# Patient Record
Sex: FEMALE | Race: WHITE | Employment: UNEMPLOYED | URBAN - METROPOLITAN AREA
[De-identification: names, ages, dates, MRNs, and addresses within clinical notes are randomized per-mention and may not be internally consistent; named-entity substitution may affect disease eponyms.]

---

## 2017-01-06 DIAGNOSIS — I10 ESSENTIAL HYPERTENSION: ICD-10-CM

## 2017-01-08 RX ORDER — TRIAMTERENE AND HYDROCHLOROTHIAZIDE 37.5; 25 MG/1; MG/1
CAPSULE ORAL
Qty: 30 CAP | Refills: 5 | Status: SHIPPED | OUTPATIENT
Start: 2017-01-08 | End: 2017-04-18 | Stop reason: SDUPTHER

## 2017-02-01 DIAGNOSIS — E78.00 PURE HYPERCHOLESTEROLEMIA: ICD-10-CM

## 2017-02-03 RX ORDER — LOVASTATIN 40 MG/1
TABLET ORAL
Qty: 90 TAB | Refills: 0 | Status: SHIPPED | COMMUNITY
Start: 2017-02-03 | End: 2017-04-18 | Stop reason: SDUPTHER

## 2017-04-18 ENCOUNTER — OFFICE VISIT (OUTPATIENT)
Dept: FAMILY MEDICINE CLINIC | Age: 62
End: 2017-04-18

## 2017-04-18 VITALS
WEIGHT: 223 LBS | TEMPERATURE: 97.2 F | HEART RATE: 68 BPM | BODY MASS INDEX: 41.04 KG/M2 | DIASTOLIC BLOOD PRESSURE: 55 MMHG | HEIGHT: 62 IN | OXYGEN SATURATION: 95 % | SYSTOLIC BLOOD PRESSURE: 104 MMHG

## 2017-04-18 DIAGNOSIS — E66.01 MORBID OBESITY DUE TO EXCESS CALORIES (HCC): ICD-10-CM

## 2017-04-18 DIAGNOSIS — G89.29 CHRONIC PAIN OF LEFT KNEE: ICD-10-CM

## 2017-04-18 DIAGNOSIS — E78.00 PURE HYPERCHOLESTEROLEMIA: ICD-10-CM

## 2017-04-18 DIAGNOSIS — I10 ESSENTIAL HYPERTENSION: Primary | ICD-10-CM

## 2017-04-18 DIAGNOSIS — K21.00 GASTROESOPHAGEAL REFLUX DISEASE WITH ESOPHAGITIS: ICD-10-CM

## 2017-04-18 DIAGNOSIS — M25.562 CHRONIC PAIN OF LEFT KNEE: ICD-10-CM

## 2017-04-18 RX ORDER — LOVASTATIN 40 MG/1
TABLET ORAL
Qty: 30 TAB | Refills: 5 | Status: SHIPPED | OUTPATIENT
Start: 2017-04-18 | End: 2017-04-18 | Stop reason: SDUPTHER

## 2017-04-18 RX ORDER — LOVASTATIN 40 MG/1
40 TABLET ORAL
Qty: 90 TAB | Refills: 1 | Status: SHIPPED | OUTPATIENT
Start: 2017-04-18

## 2017-04-18 RX ORDER — TRIAMTERENE AND HYDROCHLOROTHIAZIDE 37.5; 25 MG/1; MG/1
1 CAPSULE ORAL DAILY
Qty: 90 CAP | Refills: 1 | Status: SHIPPED | OUTPATIENT
Start: 2017-04-18 | End: 2017-04-18 | Stop reason: SDUPTHER

## 2017-04-18 RX ORDER — TRIAMTERENE AND HYDROCHLOROTHIAZIDE 37.5; 25 MG/1; MG/1
1 CAPSULE ORAL DAILY
Qty: 30 CAP | Refills: 0 | Status: SHIPPED | OUTPATIENT
Start: 2017-04-18 | End: 2017-04-18 | Stop reason: SDUPTHER

## 2017-04-18 RX ORDER — PANTOPRAZOLE SODIUM 20 MG/1
20 TABLET, DELAYED RELEASE ORAL DAILY
Qty: 90 TAB | Refills: 1 | Status: SHIPPED | OUTPATIENT
Start: 2017-04-18 | End: 2018-12-18

## 2017-04-18 RX ORDER — TRIAMTERENE AND HYDROCHLOROTHIAZIDE 37.5; 25 MG/1; MG/1
1 CAPSULE ORAL DAILY
Qty: 30 CAP | Refills: 0 | Status: SHIPPED | OUTPATIENT
Start: 2017-04-18 | End: 2017-05-15 | Stop reason: SDUPTHER

## 2017-04-18 RX ORDER — TRIAMTERENE AND HYDROCHLOROTHIAZIDE 37.5; 25 MG/1; MG/1
1 CAPSULE ORAL DAILY
Qty: 30 CAP | Refills: 5 | Status: SHIPPED | OUTPATIENT
Start: 2017-04-18 | End: 2017-04-18 | Stop reason: SDUPTHER

## 2017-04-18 NOTE — PATIENT INSTRUCTIONS
Learning About Dietary Guidelines  What are the Dietary Guidelines for Americans? Dietary Guidelines for Americans provide tips for eating well and staying healthy. This helps reduce the risk for long-term (chronic) diseases. These adult guidelines from the [place] recommend that you:  · Eat lots of fruits, vegetables, whole grains, and low-fat or nonfat dairy products. · Try to balance your eating with your activity. This helps you stay at a healthy weight. · Drink alcohol in moderation, if at all. · Limit foods high in salt, saturated fat, trans fat, and added sugar. What is MyPlate? MyPlate is the U.S. government's food guide. It can help you make your own well-balanced eating plan. A balanced eating plan means that you eat enough, but not too much, and that your food gives you the nutrients you need to stay healthy. MyPlate focuses on eating plenty of whole grains, fruits, and vegetables, and on limiting fat and sugar. It is available online at www. ChooseMyPlate.gov. How can you get started? MyPlate suggests that most adults eat certain amounts from the different food groups:  Grains  Eat 5 to 8 ounces of grains each day. Half of those should be whole grains. Choose whole-grain breads, cold and cooked cereals and grains, pasta (without creamy sauces), hard rolls, or low-fat or fat-free crackers. Vegetables  Eat 2 to 3 cups of vegetables every day. They contain little if any fat. And they have lots of nutrients that help protect against heart disease. Fruits  Eat 1½ to 2 cups of fruits every day. Fruits contain very little fat but lots of nutrients. Protein foods  Most adults need 5 to 6½ ounces each day. Choose fish and lean poultry more often. Eat red meat and fried meats less often. Dried beans, tofu, and nuts are also good sources of protein. Dairy  Most adults need 3 cups of milk and milk products a day. Choose low-fat or fat-free products from this food group.  If you have problems digesting milk, try eating cheese or yogurt instead. Limit fats and oils, including those used in cooking. When you do use fats, choose oils that are liquid at room temperature (unsaturated fats). These include canola oil and olive oil. Avoid foods with trans fats, such as many fried foods, cookies, and snack foods. Where can you learn more? Go to http://gabriel-jay.info/. Enter U141 in the search box to learn more about \"Learning About Dietary Guidelines. \"  Current as of: July 26, 2016  Content Version: 11.2  © 5256-4454 [employer]. Care instructions adapted under license by [employer] (which disclaims liability or warranty for this information). If you have questions about a medical condition or this instruction, always ask your healthcare professional. [address] any warranty or liability for your use of this information.

## 2017-04-18 NOTE — MR AVS SNAPSHOT
Visit Information Date & Time Provider Department Dept. Phone Encounter #  
 4/18/2017  8:00 AM Tika Diana, Braeden Christy [phone] [id number] Follow-up Instructions Return in about 6 months (around 10/18/2017). Upcoming Health Maintenance Date Due Hepatitis C Screening 1955 FOOT EXAM Q1 7/6/1967 MICROALBUMIN Q1 7/6/1967 EYE EXAM RETINAL OR DILATED Q1 7/6/1967 Pneumococcal 19-64 Medium Risk (1 of 1 - PPSV23) 7/6/1976 DTaP/Tdap/Td series (1 - Tdap) 7/6/1978 FOBT Q 1 YEAR AGE 50-75 7/6/2007 BREAST CANCER SCRN MAMMOGRAM 10/3/2014 PAP AKA CERVICAL CYTOLOGY 10/3/2015 INFLUENZA AGE 9 TO ADULT 8/1/2016 HEMOGLOBIN A1C Q6M 10/14/2016 LIPID PANEL Q1 4/14/2017 Allergies as of 4/18/2017  Review Complete On: 4/18/2017 By: Candy Pedersen LPN Severity Noted Reaction Type Reactions Pcn [Penicillins] High 04/03/2013    Anaphylaxis Azithromycin  11/30/2013   Side Effect Other (comments) Tongue gotten black nad had bumps on the back of the tongue. She tolerates Biaxin per patient Current Immunizations  Never Reviewed Name Date Influenza Vaccine 10/30/2015 Influenza Vaccine PF 11/10/2014 Not reviewed this visit You Were Diagnosed With   
  
 Codes Comments Morbid obesity due to excess calories ([facility] [address]    -  Primary ICD-10-CM: E66.01 
ICD-9-CM: 278.01 Essential hypertension     ICD-10-CM: I10 
ICD-9-CM: 401.9 Pure hypercholesterolemia     ICD-10-CM: E78.00 ICD-9-CM: 272.0 Vitals BP Pulse Temp Height(growth percentile) Weight(growth percentile) SpO2  
 104/55 (BP 1 Location: Left arm, BP Patient Position: Sitting) 68 97.2 °F (36.2 °C) (Oral) 5' 2\" (1.575 m) 223 lb (101.2 kg) 95% BMI OB Status Smoking Status 40.79 kg/m2 Postmenopausal Never Smoker Vitals History BMI and BSA Data Body Mass Index Body Surface Area  40.79 kg/m 2 2.1 m 2  
  
  
 Preferred Pharmacy Pharmacy Name Phone [facility] [address]  [facility] [phone] Your Updated Medication List  
  
   
This list is accurate as of: 4/18/17  8:53 AM.  Always use your most recent med list.  
  
  
  
  
 ascorbic acid (vitamin C) 500 mg tablet Commonly known as:  VITAMIN C Take 1,000 mg by mouth. Cholecalciferol (Vitamin D3) 50,000 unit Cap Take  by mouth. Over the counter, dose unknown FISH OIL 1,000 mg Cap Generic drug:  omega-3 fatty acids-vitamin e Take 1 Cap by mouth.  
  
 levothyroxine 75 mcg tablet Commonly known as:  SYNTHROID Take 1 Tab by mouth Daily (before breakfast). lovastatin 40 mg tablet Commonly known as:  MEVACOR Take 1 Tab by mouth nightly. meloxicam 7.5 mg tablet Commonly known as:  MOBIC Take 1 Tab by mouth daily. multivitamin tablet Commonly known as:  ONE A DAY Take 1 Tab by mouth daily. triamterene-hydroCHLOROthiazide 37.5-25 mg per capsule Commonly known as:  Jenny Eagle Take 1 Cap by mouth daily. VITAMIN B-12 1,000 mcg tablet Generic drug:  cyanocobalamin Take 1,000 mcg by mouth daily. Prescriptions Sent to Pharmacy Refills  
 lovastatin (MEVACOR) 40 mg tablet 1 Sig: Take 1 Tab by mouth nightly. Class: Normal  
 Pharmacy: [address], [address] Ph #: [phone] Route: Oral  
 triamterene-hydroCHLOROthiazide (DYAZIDE) 37.5-25 mg per capsule 0 Sig: Take 1 Cap by mouth daily. Class: Normal  
 [facility] [address] AT [facility] [address] [facility] Ph #: [phone] Route: Oral  
  
We Performed the Following CBC W/O DIFF [64374 CPT(R)] HEMOGLOBIN A1C W/O EAG [48896 CPT(R)] LIPID PANEL [30449 CPT(R)] METABOLIC PANEL, COMPREHENSIVE [05216 CPT(R)] Follow-up Instructions Return in about 6 months (around 10/18/2017). Patient Instructions Learning About Dietary Guidelines What are the Dietary Guidelines for Americans? Dietary Guidelines for Americans provide tips for eating well and staying healthy. This helps reduce the risk for long-term (chronic) diseases. These adult guidelines from the [place] recommend that you: 
· Eat lots of fruits, vegetables, whole grains, and low-fat or nonfat dairy products. · Try to balance your eating with your activity. This helps you stay at a healthy weight. · Drink alcohol in moderation, if at all. · Limit foods high in salt, saturated fat, trans fat, and added sugar. What is MyPlate? MyPlate is the U.S. government's food guide. It can help you make your own well-balanced eating plan. A balanced eating plan means that you eat enough, but not too much, and that your food gives you the nutrients you need to stay healthy. MyPlate focuses on eating plenty of whole grains, fruits, and vegetables, and on limiting fat and sugar. It is available online at www. ChooseMyPlate.gov. How can you get started? MyPlate suggests that most adults eat certain amounts from the different food groups: 
Grains Eat 5 to 8 ounces of grains each day. Half of those should be whole grains. Choose whole-grain breads, cold and cooked cereals and grains, pasta (without creamy sauces), hard rolls, or low-fat or fat-free crackers. Vegetables Eat 2 to 3 cups of vegetables every day. They contain little if any fat. And they have lots of nutrients that help protect against heart disease. Fruits Eat 1½ to 2 cups of fruits every day. Fruits contain very little fat but lots of nutrients. Protein foods Most adults need 5 to 6½ ounces each day. Choose fish and lean poultry more often. Eat red meat and fried meats less often. Dried beans, tofu, and nuts are also good sources of protein. Dairy Most adults need 3 cups of milk and milk products a day. Choose low-fat or fat-free products from this food group. If you have problems digesting milk, try eating cheese or yogurt instead. Limit fats and oils, including those used in cooking. When you do use fats, choose oils that are liquid at room temperature (unsaturated fats). These include canola oil and olive oil. Avoid foods with trans fats, such as many fried foods, cookies, and snack foods. Where can you learn more? Go to http://gabriel-jay.info/. Enter F060 in the search box to learn more about \"Learning About Dietary Guidelines. \" Current as of: July 26, 2016 Content Version: 11.2 © 3856-6819 [employer]. Care instructions adapted under license by [employer] (which disclaims liability or warranty for this information). If you have questions about a medical condition or this instruction, always ask your healthcare professional. [place] [address] any warranty or liability for your use of this information. Introducing [facility] & HEALTH SERVICES! Dear Keren Montemayor: Thank you for requesting a [facility] account. Our records indicate that you already have an active [employer] account. You can access your account anytime at [email or web address] Did you know that you can access your hospital and ER discharge instructions at any time in [facility] [facility]? You can also review all of your test results from your hospital stay or ER visit. Additional Information If you have questions, please visit the Frequently Asked Questions section of the [employer] website at [email or web address] Remember, [facility] [facility] is NOT to be used for urgent needs. For medical emergencies, dial 911. Now available from your iPhone and Android! Please provide this summary of care documentation to your next provider. Your primary care clinician is listed as Iesha Dunn.  If you have any questions after today's visit, please call [phone].

## 2017-04-18 NOTE — PROGRESS NOTES
Sarah Quiles is an 63 y.o. female who presents for medication refills. HTN: on dyazide daily. Tries to have low sodium diet. HLD: on lovastatin daily. Hypothyroidism: managed by Dr. Kathrine Goodman, Endocrine. Chronic knee pain: would like referral to Ortho. GERD: hx of hiatal hernia. Complains of difficulty eating sometimes because of acid reflux. Takes tums and maalox. Allergies - reviewed: Allergies   Allergen Reactions    Pcn [Penicillins] Anaphylaxis    Azithromycin Other (comments)     Tongue gotten black nad had bumps on the back of the tongue. She tolerates Biaxin per patient         Medications - reviewed:   Current Outpatient Prescriptions   Medication Sig    lovastatin (MEVACOR) 40 mg tablet Take 1 Tab by mouth nightly.  triamterene-hydroCHLOROthiazide (DYAZIDE) 37.5-25 mg per capsule Take 1 Cap by mouth daily.  pantoprazole (PROTONIX) 20 mg tablet Take 1 Tab by mouth daily.  ascorbic acid (VITAMIN C) 500 mg tablet Take 1,000 mg by mouth.  levothyroxine (SYNTHROID) 75 mcg tablet Take 1 Tab by mouth Daily (before breakfast). (Patient taking differently: Take 88 mcg by mouth Daily (before breakfast). )    omega-3 fatty acids-vitamin e (FISH OIL) 1,000 mg cap Take 1 Cap by mouth.  Cholecalciferol, Vitamin D3, 50,000 unit cap Take  by mouth. Over the counter, dose unknown    cyanocobalamin (VITAMIN B-12) 1,000 mcg tablet Take 1,000 mcg by mouth daily.  multivitamin (ONE A DAY) tablet Take 1 Tab by mouth daily.  meloxicam (MOBIC) 7.5 mg tablet Take 1 Tab by mouth daily. No current facility-administered medications for this visit.           Past Medical History - reviewed:  Past Medical History:   Diagnosis Date    Diabetes ([address]     Hypercholesterolemia     Hypertension     Multiple sclerosis ([facility] [address]     Thyroid disease          Past Surgical History - reviewed:   Past Surgical History:   Procedure Laterality Date    HX ADENOIDECTOMY      HX HERNIA REPAIR  12/17/2007    hiatal hernia    HX TONSILLECTOMY      SHOULDER SURG PROC UNLISTED  7/18/2008    massive shoulder repair; has titanium screws         Social History - reviewed:  Social History     Social History    Marital status:      Spouse name: N/A    Number of children: N/A    Years of education: N/A     Occupational History    Not on file. Social History Main Topics    Smoking status: Never Smoker    Smokeless tobacco: Never Used    Alcohol use No    Drug use: No    Sexual activity: Not on file     Other Topics Concern    Not on file     Social History Narrative         Family History - reviewed:  Family History   Problem Relation Age of Onset    Heart Surgery Father     Heart Attack Father     Heart Disease Father     Cancer Paternal Grandfather      \"all over the body\"         Immunizations - reviewed:   Immunization History   Administered Date(s) Administered    Influenza Vaccine 10/30/2015    Influenza Vaccine PF 11/10/2014           ROS  CONSTITUTIONAL: weight gain  CARDIOVASCULAR: Denies: chest pain  RESPIRATORY: Denies: shortness of breath  GI: abdominal pain        Physical Exam  Visit Vitals    /55 (BP 1 Location: Left arm, BP Patient Position: Sitting)    Pulse 68    Temp 97.2 °F (36.2 °C) (Oral)    Ht 5' 2\" (1.575 m)    Wt 223 lb (101.2 kg)    SpO2 95%    BMI 40.79 kg/m2       General appearance - alert, well appearing, and in no distress  Chest - clear to auscultation, no wheezes, rales or rhonchi, symmetric air entry  Heart - normal rate, regular rhythm, normal S1, S2, no murmurs  Abdomen - soft, nontender, nondistended  Extremities -no pedal edema  Skin - normal coloration and turgor      Assessment/Plan    ICD-10-CM ICD-9-CM    1.  Essential hypertension I10 401.9 CBC W/O DIFF      METABOLIC PANEL, COMPREHENSIVE      LIPID PANEL      HEMOGLOBIN A1C W/O EAG      triamterene-hydroCHLOROthiazide (DYAZIDE) 37.5-25 mg per capsule DISCONTINUED: triamterene-hydroCHLOROthiazide (DYAZIDE) 37.5-25 mg per capsule      DISCONTINUED: triamterene-hydroCHLOROthiazide (DYAZIDE) 37.5-25 mg per capsule      DISCONTINUED: triamterene-hydroCHLOROthiazide (DYAZIDE) 37.5-25 mg per capsule   2. Pure hypercholesterolemia E78.00 272.0 CBC W/O DIFF      METABOLIC PANEL, COMPREHENSIVE      LIPID PANEL      HEMOGLOBIN A1C W/O EAG      lovastatin (MEVACOR) 40 mg tablet      DISCONTINUED: lovastatin (MEVACOR) 40 mg tablet   3. Morbid obesity due to excess calories (HCC) E66.01 278.01 CBC W/O DIFF      METABOLIC PANEL, COMPREHENSIVE      LIPID PANEL      HEMOGLOBIN A1C W/O EAG   4. Gastroesophageal reflux disease with esophagitis K21.0 530.11    5. Chronic pain of left knee M25.562 719.46 REFERRAL TO ORTHOPEDIC SURGERY    G89.29 338.29        HTN: triamterene-HCTZ 37.5-25mg daily. BP controlled. Refilled this. Tries to have a low sodium diet. Check CMP today. HLD: on lovastatin 40mg. Refilled this. Check lipid panel today. Morbid obesity: counseled on weight loss. Check HbA1c. GERD with hx of hiatal hernia: prescribed protonix 20mg daiy. Chronic arthritis: requesting Ortho referral. Given referral to Dr. Steve Santamaria. Preventive screening: check CBC      Follow-up Disposition:  Return in about 6 months (around 10/18/2017). Encouraged to make appointment for CPE. I have discussed the diagnosis with the patient and the intended plan as seen in the above orders. The patient has received an after-visit summary and questions were answered concerning future plans. I have discussed medication side effects and warnings with the patient as well.       Nikita Antunez MD  Family Medicine Resident

## 2017-04-18 NOTE — PROGRESS NOTES
Chief Complaint   Patient presents with    Medication Evaluation     cholesterol    Labs     1. Have you been to the ER, urgent care clinic since your last visit? Hospitalized since your last visit? No    2. Have you seen or consulted any other health care providers outside of the [address] [place] [address] since your last visit? Include any pap smears or colon screening.  No

## 2017-04-19 LAB
ALBUMIN SERPL-MCNC: 4.2 G/DL (ref 3.5–5.5)
ALBUMIN/GLOB SERPL: 1.6 {RATIO} (ref 1.2–2.2)
ALP SERPL-CCNC: 114 IU/L (ref 39–117)
ALT SERPL-CCNC: 16 IU/L (ref 0–32)
AST SERPL-CCNC: 12 IU/L (ref 0–40)
BILIRUB SERPL-MCNC: 0.3 MG/DL (ref 0–1.2)
BUN SERPL-MCNC: 14 MG/DL (ref 6–24)
BUN/CREAT SERPL: 22 (ref 9–23)
CALCIUM SERPL-MCNC: 9.4 MG/DL (ref 8.7–10.2)
CHLORIDE SERPL-SCNC: 98 MMOL/L (ref 96–106)
CHOLEST SERPL-MCNC: 245 MG/DL (ref 100–199)
CO2 SERPL-SCNC: 26 MMOL/L (ref 18–29)
CREAT SERPL-MCNC: 0.63 MG/DL (ref 0.57–1)
ERYTHROCYTE [DISTWIDTH] IN BLOOD BY AUTOMATED COUNT: 13.9 % (ref 12.3–15.4)
GLOBULIN SER CALC-MCNC: 2.6 G/DL (ref 1.5–4.5)
GLUCOSE SERPL-MCNC: 113 MG/DL (ref 65–99)
HBA1C MFR BLD: 6.2 % (ref 4.8–5.6)
HCT VFR BLD AUTO: 40.3 % (ref 34–46.6)
HDLC SERPL-MCNC: 99 MG/DL
HGB BLD-MCNC: 13.5 G/DL (ref 11.1–15.9)
INTERPRETATION, 910389: NORMAL
LDLC SERPL CALC-MCNC: 127 MG/DL (ref 0–99)
MCH RBC QN AUTO: 30.4 PG (ref 26.6–33)
MCHC RBC AUTO-ENTMCNC: 33.5 G/DL (ref 31.5–35.7)
MCV RBC AUTO: 91 FL (ref 79–97)
PLATELET # BLD AUTO: 290 X10E3/UL (ref 150–379)
POTASSIUM SERPL-SCNC: 4.5 MMOL/L (ref 3.5–5.2)
PROT SERPL-MCNC: 6.8 G/DL (ref 6–8.5)
RBC # BLD AUTO: 4.44 X10E6/UL (ref 3.77–5.28)
SODIUM SERPL-SCNC: 140 MMOL/L (ref 134–144)
TRIGL SERPL-MCNC: 95 MG/DL (ref 0–149)
VLDLC SERPL CALC-MCNC: 19 MG/DL (ref 5–40)
WBC # BLD AUTO: 6.9 X10E3/UL (ref 3.4–10.8)

## 2017-04-21 ENCOUNTER — DOCUMENTATION ONLY (OUTPATIENT)
Dept: FAMILY MEDICINE CLINIC | Age: 62
End: 2017-04-21

## 2017-04-21 NOTE — PROGRESS NOTES
Spoke with patient about lab results. Recommend metformin but patient already has gastric ulcers and has frequent GI discomfort. She follows with GI for this. She prefers not to start metformin, due to the transient effect of GI discomfort within the first couple weeks of use. She will work on diet and exercise and return in 6 months to re-check hbA1c.

## 2017-05-15 DIAGNOSIS — I10 ESSENTIAL HYPERTENSION: ICD-10-CM

## 2017-05-15 RX ORDER — TRIAMTERENE AND HYDROCHLOROTHIAZIDE 37.5; 25 MG/1; MG/1
1 CAPSULE ORAL DAILY
Qty: 30 CAP | Refills: 0 | Status: SHIPPED | OUTPATIENT
Start: 2017-05-15 | End: 2018-12-18

## 2017-05-15 NOTE — TELEPHONE ENCOUNTER
Patient needs a refill of the following  Requested Prescriptions     Pending Prescriptions Disp Refills    triamterene-hydroCHLOROthiazide (DYAZIDE) 37.5-25 mg per capsule 30 Cap 0     Sig: Take 1 Cap by mouth daily.

## 2017-05-16 ENCOUNTER — APPOINTMENT (OUTPATIENT)
Dept: CT IMAGING | Age: 62
End: 2017-05-16
Attending: EMERGENCY MEDICINE
Payer: COMMERCIAL

## 2017-05-16 ENCOUNTER — HOSPITAL ENCOUNTER (EMERGENCY)
Age: 62
Discharge: HOME | End: 2017-05-16
Attending: EMERGENCY MEDICINE | Admitting: INTERNAL MEDICINE
Payer: COMMERCIAL

## 2017-05-16 VITALS
TEMPERATURE: 98.1 F | RESPIRATION RATE: 23 BRPM | HEART RATE: 74 BPM | WEIGHT: 223 LBS | OXYGEN SATURATION: 92 % | HEIGHT: 61 IN | SYSTOLIC BLOOD PRESSURE: 113 MMHG | DIASTOLIC BLOOD PRESSURE: 56 MMHG | BODY MASS INDEX: 42.1 KG/M2

## 2017-05-16 DIAGNOSIS — K57.30 DIVERTICULOSIS OF LARGE INTESTINE WITHOUT HEMORRHAGE: ICD-10-CM

## 2017-05-16 DIAGNOSIS — T18.108A ESOPHAGEAL FOREIGN BODY, INITIAL ENCOUNTER: Primary | ICD-10-CM

## 2017-05-16 DIAGNOSIS — K80.20 GALLSTONES: ICD-10-CM

## 2017-05-16 DIAGNOSIS — K44.9 HIATAL HERNIA: ICD-10-CM

## 2017-05-16 LAB
ALBUMIN SERPL BCP-MCNC: 3.7 G/DL (ref 3.5–5)
ALBUMIN/GLOB SERPL: 0.9 {RATIO} (ref 1.1–2.2)
ALP SERPL-CCNC: 103 U/L (ref 45–117)
ALT SERPL-CCNC: 23 U/L (ref 12–78)
ANION GAP BLD CALC-SCNC: 6 MMOL/L (ref 5–15)
AST SERPL W P-5'-P-CCNC: 13 U/L (ref 15–37)
BASOPHILS # BLD AUTO: 0 K/UL (ref 0–0.1)
BASOPHILS # BLD: 0 % (ref 0–1)
BILIRUB SERPL-MCNC: 0.3 MG/DL (ref 0.2–1)
BUN SERPL-MCNC: 10 MG/DL (ref 6–20)
BUN/CREAT SERPL: 13 (ref 12–20)
CALCIUM SERPL-MCNC: 8.9 MG/DL (ref 8.5–10.1)
CHLORIDE SERPL-SCNC: 101 MMOL/L (ref 97–108)
CO2 SERPL-SCNC: 33 MMOL/L (ref 21–32)
CREAT SERPL-MCNC: 0.8 MG/DL (ref 0.55–1.02)
EOSINOPHIL # BLD: 0 K/UL (ref 0–0.4)
EOSINOPHIL NFR BLD: 0 % (ref 0–7)
ERYTHROCYTE [DISTWIDTH] IN BLOOD BY AUTOMATED COUNT: 12.9 % (ref 11.5–14.5)
GLOBULIN SER CALC-MCNC: 4.2 G/DL (ref 2–4)
GLUCOSE SERPL-MCNC: 133 MG/DL (ref 65–100)
HCT VFR BLD AUTO: 45 % (ref 35–47)
HGB BLD-MCNC: 15 G/DL (ref 11.5–16)
LIPASE SERPL-CCNC: 163 U/L (ref 73–393)
LYMPHOCYTES # BLD AUTO: 19 % (ref 12–49)
LYMPHOCYTES # BLD: 1.5 K/UL (ref 0.8–3.5)
MCH RBC QN AUTO: 30.5 PG (ref 26–34)
MCHC RBC AUTO-ENTMCNC: 33.3 G/DL (ref 30–36.5)
MCV RBC AUTO: 91.6 FL (ref 80–99)
MONOCYTES # BLD: 0.4 K/UL (ref 0–1)
MONOCYTES NFR BLD AUTO: 5 % (ref 5–13)
NEUTS SEG # BLD: 5.9 K/UL (ref 1.8–8)
NEUTS SEG NFR BLD AUTO: 76 % (ref 32–75)
PLATELET # BLD AUTO: 267 K/UL (ref 150–400)
POTASSIUM SERPL-SCNC: 3.4 MMOL/L (ref 3.5–5.1)
PROT SERPL-MCNC: 7.9 G/DL (ref 6.4–8.2)
RBC # BLD AUTO: 4.91 M/UL (ref 3.8–5.2)
SODIUM SERPL-SCNC: 140 MMOL/L (ref 136–145)
WBC # BLD AUTO: 7.8 K/UL (ref 3.6–11)

## 2017-05-16 PROCEDURE — 96374 THER/PROPH/DIAG INJ IV PUSH: CPT

## 2017-05-16 PROCEDURE — 93005 ELECTROCARDIOGRAM TRACING: CPT

## 2017-05-16 PROCEDURE — 74011250636 HC RX REV CODE- 250/636: Performed by: INTERNAL MEDICINE

## 2017-05-16 PROCEDURE — 74177 CT ABD & PELVIS W/CONTRAST: CPT

## 2017-05-16 PROCEDURE — 74011250636 HC RX REV CODE- 250/636: Performed by: EMERGENCY MEDICINE

## 2017-05-16 PROCEDURE — 94762 N-INVAS EAR/PLS OXIMTRY CONT: CPT

## 2017-05-16 PROCEDURE — 74011636320 HC RX REV CODE- 636/320: Performed by: RADIOLOGY

## 2017-05-16 PROCEDURE — 99285 EMERGENCY DEPT VISIT HI MDM: CPT

## 2017-05-16 PROCEDURE — 85025 COMPLETE CBC W/AUTO DIFF WBC: CPT | Performed by: EMERGENCY MEDICINE

## 2017-05-16 PROCEDURE — 74011636320 HC RX REV CODE- 636/320: Performed by: EMERGENCY MEDICINE

## 2017-05-16 PROCEDURE — 96375 TX/PRO/DX INJ NEW DRUG ADDON: CPT

## 2017-05-16 PROCEDURE — 36415 COLL VENOUS BLD VENIPUNCTURE: CPT | Performed by: EMERGENCY MEDICINE

## 2017-05-16 PROCEDURE — 80053 COMPREHEN METABOLIC PANEL: CPT | Performed by: EMERGENCY MEDICINE

## 2017-05-16 PROCEDURE — 96361 HYDRATE IV INFUSION ADD-ON: CPT

## 2017-05-16 PROCEDURE — 76040000019: Performed by: INTERNAL MEDICINE

## 2017-05-16 PROCEDURE — 83690 ASSAY OF LIPASE: CPT | Performed by: EMERGENCY MEDICINE

## 2017-05-16 RX ORDER — NALOXONE HYDROCHLORIDE 0.4 MG/ML
0.4 INJECTION, SOLUTION INTRAMUSCULAR; INTRAVENOUS; SUBCUTANEOUS
Status: DISCONTINUED | OUTPATIENT
Start: 2017-05-16 | End: 2017-05-16 | Stop reason: HOSPADM

## 2017-05-16 RX ORDER — ATROPINE SULFATE 0.1 MG/ML
0.4 INJECTION INTRAVENOUS
Status: DISCONTINUED | OUTPATIENT
Start: 2017-05-16 | End: 2017-05-16 | Stop reason: HOSPADM

## 2017-05-16 RX ORDER — SODIUM CHLORIDE 9 MG/ML
50 INJECTION, SOLUTION INTRAVENOUS CONTINUOUS
Status: DISCONTINUED | OUTPATIENT
Start: 2017-05-16 | End: 2017-05-17 | Stop reason: HOSPADM

## 2017-05-16 RX ORDER — DEXTROMETHORPHAN/PSEUDOEPHED 2.5-7.5/.8
1.2 DROPS ORAL
Status: DISCONTINUED | OUTPATIENT
Start: 2017-05-16 | End: 2017-05-16 | Stop reason: HOSPADM

## 2017-05-16 RX ORDER — MIDAZOLAM HYDROCHLORIDE 1 MG/ML
.25-5 INJECTION, SOLUTION INTRAMUSCULAR; INTRAVENOUS
Status: DISCONTINUED | OUTPATIENT
Start: 2017-05-16 | End: 2017-05-16 | Stop reason: HOSPADM

## 2017-05-16 RX ORDER — GUAIFENESIN 100 MG/5ML
81 LIQUID (ML) ORAL DAILY
COMMUNITY

## 2017-05-16 RX ORDER — FLUMAZENIL 0.1 MG/ML
0.2 INJECTION INTRAVENOUS
Status: DISCONTINUED | OUTPATIENT
Start: 2017-05-16 | End: 2017-05-16 | Stop reason: HOSPADM

## 2017-05-16 RX ORDER — EPINEPHRINE 0.1 MG/ML
1 INJECTION INTRACARDIAC; INTRAVENOUS
Status: DISCONTINUED | OUTPATIENT
Start: 2017-05-16 | End: 2017-05-16 | Stop reason: HOSPADM

## 2017-05-16 RX ORDER — FENTANYL CITRATE 50 UG/ML
100 INJECTION, SOLUTION INTRAMUSCULAR; INTRAVENOUS ONCE
Status: COMPLETED | OUTPATIENT
Start: 2017-05-16 | End: 2017-05-16

## 2017-05-16 RX ADMIN — MIDAZOLAM HYDROCHLORIDE 2 MG: 1 INJECTION, SOLUTION INTRAMUSCULAR; INTRAVENOUS at 22:01

## 2017-05-16 RX ADMIN — SODIUM CHLORIDE 1000 ML: 900 INJECTION, SOLUTION INTRAVENOUS at 20:52

## 2017-05-16 RX ADMIN — DIATRIZOATE MEGLUMINE AND DIATRIZOATE SODIUM 30 ML: 660; 100 LIQUID ORAL; RECTAL at 18:59

## 2017-05-16 RX ADMIN — IOPAMIDOL 100 ML: 755 INJECTION, SOLUTION INTRAVENOUS at 19:56

## 2017-05-16 RX ADMIN — MIDAZOLAM HYDROCHLORIDE 1 MG: 1 INJECTION, SOLUTION INTRAMUSCULAR; INTRAVENOUS at 22:04

## 2017-05-16 RX ADMIN — FENTANYL CITRATE 50 MCG: 50 INJECTION, SOLUTION INTRAMUSCULAR; INTRAVENOUS at 22:02

## 2017-05-16 NOTE — ED PROVIDER NOTES
HPI Comments: 63 y.o. female with past medical history significant for hypercholesterolemia, HTN, diabetes, MS and hiatal hernia repair who presents from home with chief complaint of abdominal pain. Pt states that over this past weekend she fell onto her buttocks while at the beach. Pt denies hitting her abdomen during the fall. Pt claims that since yesterday she has been experiencing LUQ abdominal pain which has worsened with associated vomiting, inability to keep anything down more than very small sips of water and intermittent chills. Pt states that she has not eaten all day and feels dehydrated. Pt says that she [facility] this morning where she received a Demerol shot and an Rx for Flexeril. Pt claims that she continues to \"not feel good\". Pt is concerned about her hiatal hernia, claiming that she had surgery to repair this in 2007 and it was discovered in 2015 that she had developed a second hiatal hernia. Pt claims that she received a procedure to [employer]" the area in 2015, denies second surgery. Pt says that she has a hx of stomach ulcers. Pt denies vomiting any bloody/green sputum. Pt denies any diarrhea, constipation or CP. Pt denies having ever experienced pain like this before. There are no other acute medical concerns at this time. PCP: Darian Bolanos MD  GI: Zhou Malhotra. Shaquille Wall MD  Surgery: Haydee Jarquin M.D. Note written by Nguyen Madsen. Betzaida Oswald, as dictated by Sammy Flores MD 6:28 PM      The history is provided by the patient. No  was used.         Past Medical History:   Diagnosis Date    Diabetes ([address]     Hypercholesterolemia     Hypertension     Multiple sclerosis ([facility] [address]     Thyroid disease        Past Surgical History:   Procedure Laterality Date    HX ADENOIDECTOMY      HX HERNIA REPAIR  12/17/2007    hiatal hernia    HX TONSILLECTOMY      SHOULDER SURG PROC UNLISTED  7/18/2008    massive shoulder repair; has titanium screws         Family History:   Problem Relation Age of Onset    Heart Surgery Father     Heart Attack Father     Heart Disease Father     Cancer Paternal Grandfather      \"all over the body\"       Social History     Social History    Marital status:      Spouse name: N/A    Number of children: N/A    Years of education: N/A     Occupational History    Not on file. Social History Main Topics    Smoking status: Never Smoker    Smokeless tobacco: Never Used    Alcohol use No    Drug use: No    Sexual activity: Not on file     Other Topics Concern    Not on file     Social History Narrative         ALLERGIES: Pcn [penicillins] and Azithromycin    Review of Systems   Constitutional: Positive for appetite change. Negative for chills and fever. Respiratory: Negative for cough and shortness of breath. Cardiovascular: Negative for chest pain. Gastrointestinal: Positive for abdominal pain (LUQ) and vomiting. Negative for constipation and diarrhea. Musculoskeletal: Negative for back pain. All other systems reviewed and are negative.       Vitals:    05/16/17 1758   BP: 142/76   Pulse: 60   Resp: 16   Temp: 98.1 °F (36.7 °C)   SpO2: 95%   Weight: 101.2 kg (223 lb)   Height: 5' 1\" (1.549 m)            Physical Exam   Physical Examination: General appearance - alert, mild distress, oriented to person, place, and time and normal appearing weight  Eyes - pupils equal and reactive, extraocular eye movements intact  Neck - supple, no significant adenopathy  Chest - clear to auscultation, no wheezes, rales or rhonchi, symmetric air entry  Heart - normal rate, regular rhythm, normal S1, S2, no murmurs, rubs, clicks or gallops  Abdomen - soft, nontender, nondistended, no masses or organomegaly  Back exam - full range of motion, no tenderness, palpable spasm or pain on motion  Neurological - alert, oriented, normal speech, no focal findings or movement disorder noted  Musculoskeletal - no joint tenderness, deformity or swelling  Extremities - peripheral pulses normal, no pedal edema, no clubbing or cyanosis  Skin - normal coloration and turgor, no rashes, no suspicious skin lesions noted  MDM  Number of Diagnoses or Management Options     Amount and/or Complexity of Data Reviewed  Clinical lab tests: ordered and reviewed  Tests in the radiology section of CPT®: ordered and reviewed  Decide to obtain previous medical records or to obtain history from someone other than the patient: yes  Obtain history from someone other than the patient: yes (family)  Review and summarize past medical records: yes  Discuss the patient with other providers: yes (GI)  Independent visualization of images, tracings, or specimens: yes    Patient Progress  Patient progress: stable    ED Course       Procedures  EKG interpretation: (Preliminary)  Rhythm: normal sinus rhythm; and regular . Rate (approx.): 66; Axis: normal; CT interval: normal; QRS interval: normal ; ST/T wave: normal;     CONSULT NOTE:  8:35 PM Bhavesh Concepcion MD spoke with Dr. Bárbara Gabriel, Consult for Gastroenterology. Discussed available diagnostic tests and clinical findings. He is in agreement with care plans as outlined. Dr. Bárbara Gabriel recommends endoscopy for pt tonight. Pt seen by Dr. Bárbara Gabriel in ED, had endoscopy with clearance of food bolus. Pt examined after procedure, awake, alert, oriented, VSS. Will d/c with family.

## 2017-05-16 NOTE — ED NOTES
Bedside and Verbal shift change report given to Belinda (oncoming nurse) by Edward Scott (offgoing nurse). Report included the following information SBAR.

## 2017-05-17 LAB
ATRIAL RATE: 66 BPM
CALCULATED P AXIS, ECG09: 20 DEGREES
CALCULATED R AXIS, ECG10: 13 DEGREES
CALCULATED T AXIS, ECG11: 60 DEGREES
DIAGNOSIS, 93000: NORMAL
P-R INTERVAL, ECG05: 118 MS
Q-T INTERVAL, ECG07: 412 MS
QRS DURATION, ECG06: 90 MS
QTC CALCULATION (BEZET), ECG08: 431 MS
VENTRICULAR RATE, ECG03: 66 BPM

## 2017-05-17 NOTE — PERIOP NOTES
TRANSFER - OUT REPORT:    Verbal report given to Arlene Mascorro (name) on Tavo Dey  being transferred to ED(unit) for routine post - op       Report consisted of patients Situation, Background, Assessment and   Recommendations(SBAR). Information from the following report(s) Procedure Summary, MAR and Recent Results was reviewed with the receiving nurse. Lines:   Peripheral IV 05/16/17 Left Antecubital (Active)   Site Assessment Clean, dry, & intact 5/16/2017  6:41 PM   Phlebitis Assessment 0 5/16/2017  6:41 PM   Infiltration Assessment 0 5/16/2017  6:41 PM   Dressing Status Clean, dry, & intact 5/16/2017  6:41 PM   Dressing Type Transparent 5/16/2017  6:41 PM   Hub Color/Line Status Pink;Flushed 5/16/2017  6:41 PM   Action Taken Blood drawn 5/16/2017  6:41 PM        Opportunity for questions and clarification was provided.       Patient transported with:   O2 @ 2 liters

## 2017-05-17 NOTE — OP NOTES
Justus Chapman [facility] 79   [address] [facility], [address]   OP NOTE       Name:  Benjamin Domínguez   MR#:  [id number]   :  1955   Account #:  [de-identified]    Surgery Date:  2017   Date of Adm:  2017       PREOPERATIVE DIAGNOSIS:     POSTOPERATIVE DIAGNOSIS:     PROCEDURES PERFORMED: Esophagogastroduodenoscopy with   removal of esophageal food bolus. ENDOSCOPIST: Neha Hdz. Jordan Mckeon MD    ESTIMATED BLOOD LOSS: none    SPECIMENS REMOVED: none    MEDICATIONS: Versed 3 mg and fentanyl 50 mcg intravenous. INDICATIONS: The patient is a 26-year-old female. She has a past   history of a Nissen fundoplication in  by Dr. Rick Busch. She   has had dysphagia in the past. In , she had upper endoscopy by   Dr. Christine Pagan. She states she has been unable to swallow for the   past day. She went to [facility]. She was given pain medicine. She   then came to [address] [place] [address] Emergency Room. She ended up being sent   for a CT scan. She could not tolerate any significant oral contrast. CT   did show that there was food in her distal esophagus. Therefore, I was   called to perform upper endoscopy. Informed consent was obtained   from the patient and the family after apprised of the risks. The   risks included, but were not limited to bleeding, perforation, aspiration,   adverse drug reaction. DESCRIPTION OF PROCEDURE: The video Olympus endoscope   was introduced into the esophagus. The proximal esophagus is normal.   In the distal esophagus, is noted a small to moderate size hiatal hernia. It appears that the hernia is above the GE junction. The hernia has   developed above the Nissen fundoplication. In the hernia sac, was a   moderate amount of particles of food. This was obstructing the distal   esophagus. I used the endoscope to gently push particles of food into   the stomach. I cleared approximately 50% of the food out of the hernia.    The esophagus now had good drainage. There was a moderate   amount of retained food in the distal esophagus. The stomach was   otherwise normal. Retroflex view of the cardia was otherwise normal.   pylorus was normal. The duodenum was normal.    IMPRESSION: Moderate amount of food retained in the hernia above   the Nissen fundoplication. Significant amounts of food advanced into   the stomach and the esophagus now drains well. There are some   retained particles of food in the hernia sac. RECOMMENDATIONS: The patient should stay on a liquid only diet   for the next 48 hours to allow the remainder of the food in the   esophagus to clear. The patient should be seen by Dr. Roldan Huerta   soon to re-evaluate the esophagus and perform dilation as indicated. The patient and family are given written instructions to call back if the   patient develops any chest pain, bleeding or shortness of breath   following this procedure.         Julia Tsai MD RD / GEORGI   D:  05/16/2017   22:19   T:  05/17/2017   05:51   Job #:  [id number]

## 2017-05-17 NOTE — PERIOP NOTES
Pt sitting in stretcher. Pre op completed for EGD for food bolus.  signed consent, since pt medicated for pain recently. Numerous family at the bedside.

## 2017-05-17 NOTE — PERIOP NOTES
Called [employer] and requested to push and pull DANIELA Phillips (patient) in [address] [facility] in ensure all Endoscopy phase of care orders have fallen off the [facility]. This is a patient safety issue.

## 2017-05-17 NOTE — PERIOP NOTES
Pt tolerated procedure well, oral suctioning provided during the case. O2 on at 2L throughout the case.

## 2017-05-17 NOTE — DISCHARGE INSTRUCTIONS
Liquid only diet for the next 2 days. Swallowed Object in Throat or Esophagus: Care Instructions  Your Care Instructions  When you swallow food, liquid, or an object, it passes from your mouth and goes down your throat and esophagus and into your stomach. But sometimes these things can get stuck in your throat or esophagus. This may make you choke, cough, or gag. Some objects can cause more problems than others. Sharp, long, or large objects can scratch or cut your throat, your esophagus, and your stomach if they get stuck or if they are swallowed. When this happens, these areas can bleed or get infected. If the object was stuck in your throat or esophagus, your doctor probably removed it. If you swallowed the object, your doctor may have suggested that you wait and see if the object comes out in your stool. Most swallowed objects will pass through your body without any problem and show up in your stool within 3 days. If the object does not show up in your stool within 7 days, your doctor may order tests to find out where it is in your body. Your throat may feel sore after you have had an object removed or have swallowed an object that has scratched your throat. It may hurt for a few days when you eat or swallow. The scratch itself may make it feel as if something is still stuck in your throat. Follow-up care is a key part of your treatment and safety. Be sure to make and go to all appointments, and call your doctor if you are having problems. It is also a good idea to know your test results and keep a list of the medicines you take. How can you care for yourself at home? · Take pain medicines exactly as directed. ¨ If the doctor gave you a prescription medicine for pain, take it as prescribed. ¨ If you are not taking a prescription pain medicine, ask your doctor if you can take an over-the-counter medicine. · If your doctor prescribed antibiotics, take them as directed.  Do not stop taking them just because you feel better. You need to take the full course of antibiotics. · Drink liquids. If swallowing liquids is easy, try eating soft foods like bread or bananas. If these foods are easy to swallow, start to add other foods. · Avoid very hot or very cold foods. · If you swallowed an object and it has passed through to your stomach, try eating foods that are high in fiber, such as fruits, vegetables, and whole grains. These foods may help you pass the object more quickly. · Watch your stools to see if the object has passed. Do not use a laxative unless your doctor says that it is okay. · Do not smoke. Smoking can irritate your throat and your esophagus even more. If you need help quitting, talk to your doctor about stop-smoking programs and medicines. These can increase your chances of quitting for good. To prevent swallowing objects or choking:  · Cut food into small pieces. · Eat slowly, take small bites, and chew your food all the way. · Do not laugh or talk with food in your mouth. · Do not eat or drink while you are doing something else, such as when you drive. · Do not hold objects, such as pins, nails, or toothpicks, in your mouth or between your lips. · Limit how much alcohol you drink while you eat. When should you call for help? Call 911 anytime you think you may need emergency care. For example, call if:  · You have chest pain. · You vomit a large amount of blood or what looks like coffee grounds. · You have severe stomach pain. · You pass maroon or very bloody stools. · You can't swallow. · You have severe trouble breathing. Call your doctor now or seek immediate medical care if:  · You have any stomach pain. · You have signs of an infection, such as:  ¨ Pain, swelling, or tenderness in or around your throat, neck, chest, or belly. ¨ A fever. ¨ A cough. ¨ Shortness of breath. · You vomit a small amount of blood or what looks like coffee grounds.   · You have trouble breathing. · You have trouble swallowing. · You vomited more than one time since you had an object removed from your throat or esophagus or since you swallowed an object. · Your stools are black and tarlike or have streaks of blood. Watch closely for changes in your health, and be sure to contact your doctor if:  · You still feel like you have something stuck in your throat or esophagus. · You do not get better as expected. Where can you learn more? Go to http://gabriel-jay.info/. Enter Q282 in the search box to learn more about \"Swallowed Object in Throat or Esophagus: Care Instructions. \"  Current as of: May 27, 2016  Content Version: 11.2  © 1445-0830 [employer]. Care instructions adapted under license by [employer] (which disclaims liability or warranty for this information). If you have questions about a medical condition or this instruction, always ask your healthcare professional. [place] [address] any warranty or liability for your use of this information. Hiatal Hernia: Care Instructions  Your Care Instructions  A hiatal hernia occurs when part of the stomach bulges into the chest cavity. A hiatal hernia may allow stomach acid and juices to back up into the esophagus (acid reflux). This can cause a feeling of burning, warmth, heat, or pain behind the breastbone. This feeling may often occur after you eat, soon after you lie down, or when you bend forward, and it may come and go. You also may have a sour taste in your mouth. These symptoms are commonly known as heartburn or reflux. But not all hiatal hernias cause symptoms. Follow-up care is a key part of your treatment and safety. Be sure to make and go to all appointments, and call your doctor if you are having problems. Its also a good idea to know your test results and keep a list of the medicines you take. How can you care for yourself at home?   · Take your medicines exactly as prescribed. Call your doctor if you think you are having a problem with your medicine. · Do not take aspirin or other nonsteroidal anti-inflammatory drugs (NSAIDs), such as ibuprofen (Advil, Motrin) or naproxen (Aleve), unless your doctor says it is okay. Ask your doctor what you can take for pain. · Your doctor may recommend over-the-counter medicine. For mild or occasional indigestion, antacids such as Tums, Gaviscon, Maalox, or Mylanta may help. Your doctor also may recommend over-the-counter acid reducers, such as famotidine (Pepcid AC), cimetidine (Tagamet HB), ranitidine (Zantac 75 and Zantac 150), or omeprazole (Prilosec). Read and follow all instructions on the label. If you use these medicines often, talk with your doctor. · Change your eating habits. ¨ Its best to eat several small meals instead of two or three large meals. ¨ After you eat, wait 2 to 3 hours before you lie down. Late-night snacks arent a good idea. ¨ Chocolate, mint, and alcohol can make heartburn worse. They relax the valve between the esophagus and the stomach. ¨ Spicy foods, foods that have a lot of acid (like tomatoes and oranges), and coffee can make heartburn symptoms worse in some people. If your symptoms are worse after you eat a certain food, you may want to stop eating that food to see if your symptoms get better. · Do not smoke or chew tobacco.  · If you get heartburn at night, raise the head of your bed 6 to 8 inches by putting the frame on blocks or placing a foam wedge under the head of your mattress. (Adding extra pillows does not work.)  · Do not wear tight clothing around your middle. · Lose weight if you need to. Losing just 5 to 10 pounds can help. When should you call for help? Call 911 anytime you think you may need emergency care. For example, call if:  · You have symptoms of a heart attack such as:  ¨ Chest pain or pressure. ¨ Sweating. ¨ Shortness of breath. ¨ Nausea or vomiting.   ¨ Pain that spreads from the chest to the neck, jaw, or one or both shoulders or arms. ¨ Dizziness or lightheadedness. ¨ A fast or uneven pulse. After calling 911, chew 1 adult-strength aspirin. Wait for an ambulance. Do not try to drive yourself. · You vomit blood or what looks like coffee grounds. · You pass maroon or very bloody stools. Call your doctor now or seek immediate medical care if:  · You have new or different belly pain. · Your stools are black and tarlike or have streaks of blood. · Food seems to catch in your throat or chest.  Watch closely for changes in your health, and be sure to contact your doctor if:  · Your symptoms get worse. · Your symptoms have not improved after 2 days. Where can you learn more? Go to http://gabriel-jay.info/. Enter V685 in the search box to learn more about \"Hiatal Hernia: Care Instructions. \"  Current as of: August 9, 2016  Content Version: 11.2  © 4714-8259 [employer]. Care instructions adapted under license by [employer] (which disclaims liability or warranty for this information). If you have questions about a medical condition or this instruction, always ask your healthcare professional. [address] any warranty or liability for your use of this information. Diverticulosis: Care Instructions  Your Care Instructions  In diverticulosis, pouches called diverticula form in the wall of the large intestine (colon). The pouches do not cause any pain or other symptoms. Most people who have diverticulosis do not know they have it. But the pouches sometimes bleed, and if they become infected, they can cause pain and other symptoms. When this happens, it is called diverticulitis. Diverticula form when pressure pushes the wall of the colon outward at certain weak points. A diet that is too low in fiber can cause diverticula. Follow-up care is a key part of your treatment and safety.  Be sure to make and go to all appointments, and call your doctor if you are having problems. It's also a good idea to know your test results and keep a list of the medicines you take. How can you care for yourself at home? · Include fruits, leafy green vegetables, beans, and whole grains in your diet each day. These foods are high in fiber. · Take a fiber supplement, such as Citrucel or Metamucil, every day if needed. Read and follow all instructions on the label. · Drink plenty of fluids, enough so that your urine is light yellow or clear like water. If you have kidney, heart, or liver disease and have to limit fluids, talk with your doctor before you increase the amount of fluids you drink. · Get at least 30 minutes of exercise on most days of the week. Walking is a good choice. You also may want to do other activities, such as running, swimming, cycling, or playing tennis or team sports. · Cut out foods that cause gas, pain, or other symptoms. When should you call for help? Call your doctor now or seek immediate medical care if:  · You have belly pain. · You pass maroon or very bloody stools. · You have a fever. · You have nausea and vomiting. · You have unusual changes in your bowel movements or abdominal swelling. · You have burning pain when you urinate. · You have abnormal vaginal discharge. · You have shoulder pain. · You have cramping pain that does not get better when you have a bowel movement or pass gas. · You pass gas or stool from your urethra while urinating. Watch closely for changes in your health, and be sure to contact your doctor if you have any problems. Where can you learn more? Go to http://gabriel-jay.info/. Enter B864 in the search box to learn more about \"Diverticulosis: Care Instructions. \"  Current as of: August 9, 2016  Content Version: 11.2  © 5265-9605 [employer].  Care instructions adapted under license by [employer] (which disclaims liability or warranty for this information). If you have questions about a medical condition or this instruction, always ask your healthcare professional. [address] any warranty or liability for your use of this information. Biliary Colic: Care Instructions  Your Care Instructions    Biliary (say \"BILL-ee-air-ee\") colic is belly pain caused by gallbladder problems. It is usually caused by a gallstone moving through or blocking the common bile duct or cystic duct. Gallstones are stones that form in the gallbladder. They are made of cholesterol and other substances. The gallbladder is a small sac located just under the liver. It stores bile released by the liver. Bile helps you digest fats. Gallstones also can form in the common bile duct or cystic duct. These ducts carry bile from the gallbladder and the liver to the small intestine. Gallstones may be as small as a grain of sand or as large as a golf ball. Gallstones that cause severe symptoms usually are treated with surgery to remove the gallbladder. If the first attack of biliary colic is mild, it is often safe to wait until you have had another attack before you think about having surgery. The doctor has checked you carefully, but problems can develop later. If you notice any problems or new symptoms, get medical treatment right away. Follow-up care is a key part of your treatment and safety. Be sure to make and go to all appointments, and call your doctor if you are having problems. It's also a good idea to know your test results and keep a list of the medicines you take. How can you care for yourself at home? · Take pain medicines exactly as directed. ¨ If the doctor gave you a prescription medicine for pain, take it as prescribed. ¨ If you are not taking a prescription pain medicine, ask your doctor if you can take an over-the-counter medicine. Read and follow all instructions on the label.   · Avoid foods that cause symptoms, especially fatty foods. These can cause biliary colic. · You may need more tests to look at your gallbladder. When should you call for help? Call your doctor now or seek immediate medical care if:  · You have a fever. · You have new belly pain, or your pain gets worse. · There is a new or increasing yellow tint to your skin or the whites of your eyes. · Your urine is dark yellow-brown, or your stools are light-colored or white. · You cannot keep down fluids. Watch closely for changes in your health, and be sure to contact your doctor if:  · You do not get better as expected. · You are not getting better after 1 day (24 hours). Where can you learn more? Go to http://gabriel-jay.info/. Enter Z571 in the search box to learn more about \"Biliary Colic: Care Instructions. \"  Current as of: August 9, 2016  Content Version: 11.2  © 4168-9928 [employer]. Care instructions adapted under license by [employer] (which disclaims liability or warranty for this information). If you have questions about a medical condition or this instruction, always ask your healthcare professional. [address] any warranty or liability for your use of this information. We hope that we have addressed all of your medical concerns. The examination and treatment you received in the Emergency Department were for an emergent problem and were not intended as complete care. It is important that you follow up with your healthcare provider(s) for ongoing care. If your symptoms worsen or do not improve as expected, and you are unable to reach your usual health care provider(s), you should return to the Emergency Department. Today's healthcare is undergoing tremendous change, and patient satisfaction surveys are one of the many tools to assess the quality of medical care.   You may receive a survey from the [employer] regarding your experience in the Emergency Department. I hope that your experience has been completely positive, particularly the medical care that I provided. As such, please participate in the survey; anything less than excellent does not meet my expectations or intentions. [facility] and [facility] participate in nationally recognized quality of care measures. If your blood pressure is greater than 120/80, as reported below, we urge that you seek medical care to address the potential of high blood pressure, commonly known as hypertension. Hypertension can be hereditary or can be caused by certain medical conditions, pain, stress, or \"white coat syndrome. \"       Please make an appointment with your health care provider(s) for follow up of your Emergency Department visit. VITALS:   Patient Vitals for the past 8 hrs:   Temp Pulse Resp BP SpO2   05/16/17 2230 - 74 23 113/56 92 %   05/16/17 2225 - 75 10 119/53 92 %   05/16/17 2220 - 80 10 106/55 91 %   05/16/17 2215 - 83 23 124/56 90 %   05/16/17 2210 - (!) 103 27 (!) 144/93 92 %   05/16/17 2205 - 77 17 137/76 91 %   05/16/17 2200 - 63 19 131/59 95 %   05/16/17 2123 - 76 18 147/87 93 %   05/16/17 1758 98.1 °F (36.7 °C) 60 16 142/76 95 %          Thank you for allowing us to provide you with medical care today. We realize that you have many choices for your emergency care needs. Please choose us in the future for any continued health care needs. Polly Hall, [address] [place], [place] [address]y 20.   Office: [phone]            Recent Results (from the past 24 hour(s))   CBC WITH AUTOMATED DIFF    Collection Time: 05/16/17  6:40 PM   Result Value Ref Range    WBC 7.8 3.6 - 11.0 K/uL    RBC 4.91 3.80 - 5.20 M/uL    HGB 15.0 11.5 - 16.0 g/dL    HCT 45.0 35.0 - 47.0 %    MCV 91.6 80.0 - 99.0 FL    MCH 30.5 26.0 - 34.0 PG    MCHC 33.3 30.0 - 36.5 g/dL    RDW 12.9 11.5 - 14.5 %    PLATELET 703 150 - 400 K/uL    NEUTROPHILS 76 (H) 32 - 75 %    LYMPHOCYTES 19 12 - 49 %    MONOCYTES 5 5 - 13 %    EOSINOPHILS 0 0 - 7 %    BASOPHILS 0 0 - 1 %    ABS. NEUTROPHILS 5.9 1.8 - 8.0 K/UL    ABS. LYMPHOCYTES 1.5 0.8 - 3.5 K/UL    ABS. MONOCYTES 0.4 0.0 - 1.0 K/UL    ABS. EOSINOPHILS 0.0 0.0 - 0.4 K/UL    ABS. BASOPHILS 0.0 0.0 - 0.1 K/UL   METABOLIC PANEL, COMPREHENSIVE    Collection Time: 05/16/17  6:40 PM   Result Value Ref Range    Sodium 140 136 - 145 mmol/L    Potassium 3.4 (L) 3.5 - 5.1 mmol/L    Chloride 101 97 - 108 mmol/L    CO2 33 (H) 21 - 32 mmol/L    Anion gap 6 5 - 15 mmol/L    Glucose 133 (H) 65 - 100 mg/dL    BUN 10 6 - 20 MG/DL    Creatinine 0.80 0.55 - 1.02 MG/DL    BUN/Creatinine ratio 13 12 - 20      GFR est AA >60 >60 ml/min/1.73m2    GFR est non-AA >60 >60 ml/min/1.73m2    Calcium 8.9 8.5 - 10.1 MG/DL    Bilirubin, total 0.3 0.2 - 1.0 MG/DL    ALT (SGPT) 23 12 - 78 U/L    AST (SGOT) 13 (L) 15 - 37 U/L    Alk.  phosphatase 103 45 - 117 U/L    Protein, total 7.9 6.4 - 8.2 g/dL    Albumin 3.7 3.5 - 5.0 g/dL    Globulin 4.2 (H) 2.0 - 4.0 g/dL    A-G Ratio 0.9 (L) 1.1 - 2.2     LIPASE    Collection Time: 05/16/17  6:40 PM   Result Value Ref Range    Lipase 163 73 - 393 U/L   EKG, 12 LEAD, INITIAL    Collection Time: 05/16/17  7:35 PM   Result Value Ref Range    Ventricular Rate 66 BPM    Atrial Rate 66 BPM    P-R Interval 118 ms    QRS Duration 90 ms    Q-T Interval 412 ms    QTC Calculation (Bezet) 431 ms    Calculated P Axis 20 degrees    Calculated R Axis 13 degrees    Calculated T Axis 60 degrees    Diagnosis       Normal sinus rhythm  Normal ECG  When compared with ECG of 15-PAYAL-2015 08:45,  Nonspecific T wave abnormality no longer evident in Inferior leads         Ct Abd Pelv W Cont    Result Date: 5/16/2017  INDICATION: Epigastric pain and vomiting for several weeks, hx of hiatal hernia repair COMPARISON: 6/15/2015 TECHNIQUE: Following the uneventful intravenous administration of 100 cc Isovue-370, thin axial images were obtained through the abdomen and pelvis. Coronal and sagittal reconstructions were generated. Oral contrast was not administered. CT dose reduction was achieved through use of a standardized protocol tailored for this examination and automatic exposure control for dose modulation. FINDINGS: LUNG BASES: Clear. Recurrent larger hiatal hernia, with an axial dimension currently of the 6.7 cm INCIDENTALLY IMAGED HEART AND MEDIASTINUM: Unremarkable. LIVER: No mass or biliary dilatation. GALLBLADDER: Multiple gallstones SPLEEN: No mass. PANCREAS: No mass or ductal dilatation. ADRENALS: Unremarkable. KIDNEYS: No mass, calculus, or hydronephrosis. STOMACH: Unremarkable. SMALL BOWEL: No dilatation or wall thickening. COLON: No dilatation or wall thickening. Multiple sigmoid diverticula APPENDIX: Unremarkable. PERITONEUM: No ascites or pneumoperitoneum. RETROPERITONEUM: No lymphadenopathy or aortic aneurysm. REPRODUCTIVE ORGANS: Small uterus URINARY BLADDER: No mass or calculus. BONES: No destructive bone lesion.  ADDITIONAL COMMENTS: Lumbosacral facet arthropathy     IMPRESSION: Recurrent, larger hiatal hernia Cholelithiasis Sigmoid diverticulosis, without evidence for diverticulitis

## 2018-12-17 ENCOUNTER — APPOINTMENT (OUTPATIENT)
Dept: CT IMAGING | Age: 63
End: 2018-12-17
Attending: EMERGENCY MEDICINE
Payer: COMMERCIAL

## 2018-12-17 ENCOUNTER — HOSPITAL ENCOUNTER (OUTPATIENT)
Age: 63
Setting detail: OBSERVATION
Discharge: LEFT BEFORE DISCHARGE | End: 2018-12-18
Attending: EMERGENCY MEDICINE | Admitting: INTERNAL MEDICINE
Payer: COMMERCIAL

## 2018-12-17 DIAGNOSIS — R10.13 ABDOMINAL PAIN, EPIGASTRIC: Primary | ICD-10-CM

## 2018-12-17 DIAGNOSIS — K22.89 DILATION OF ESOPHAGUS: ICD-10-CM

## 2018-12-17 DIAGNOSIS — K44.9 HIATAL HERNIA: ICD-10-CM

## 2018-12-17 LAB
ALBUMIN SERPL-MCNC: 3.6 G/DL (ref 3.5–5)
ALBUMIN/GLOB SERPL: 0.8 {RATIO} (ref 1.1–2.2)
ALP SERPL-CCNC: 112 U/L (ref 45–117)
ALT SERPL-CCNC: 26 U/L (ref 12–78)
ANION GAP SERPL CALC-SCNC: 7 MMOL/L (ref 5–15)
APPEARANCE UR: ABNORMAL
AST SERPL-CCNC: 17 U/L (ref 15–37)
BACTERIA URNS QL MICRO: NEGATIVE /HPF
BASOPHILS # BLD: 0 K/UL (ref 0–0.1)
BASOPHILS NFR BLD: 0 % (ref 0–1)
BILIRUB SERPL-MCNC: 0.3 MG/DL (ref 0.2–1)
BILIRUB UR QL: NEGATIVE
BUN SERPL-MCNC: 12 MG/DL (ref 6–20)
BUN/CREAT SERPL: 16 (ref 12–20)
CALCIUM SERPL-MCNC: 9.5 MG/DL (ref 8.5–10.1)
CHLORIDE SERPL-SCNC: 101 MMOL/L (ref 97–108)
CO2 SERPL-SCNC: 31 MMOL/L (ref 21–32)
COLOR UR: ABNORMAL
COMMENT, HOLDF: NORMAL
CREAT SERPL-MCNC: 0.73 MG/DL (ref 0.55–1.02)
DIFFERENTIAL METHOD BLD: ABNORMAL
EOSINOPHIL # BLD: 0 K/UL (ref 0–0.4)
EOSINOPHIL NFR BLD: 0 % (ref 0–7)
EPITH CASTS URNS QL MICRO: ABNORMAL /LPF
ERYTHROCYTE [DISTWIDTH] IN BLOOD BY AUTOMATED COUNT: 12.5 % (ref 11.5–14.5)
GLOBULIN SER CALC-MCNC: 4.3 G/DL (ref 2–4)
GLUCOSE SERPL-MCNC: 124 MG/DL (ref 65–100)
GLUCOSE UR STRIP.AUTO-MCNC: NEGATIVE MG/DL
HCT VFR BLD AUTO: 44.6 % (ref 35–47)
HGB BLD-MCNC: 15 G/DL (ref 11.5–16)
HGB UR QL STRIP: NEGATIVE
IMM GRANULOCYTES # BLD: 0 K/UL (ref 0–0.04)
IMM GRANULOCYTES NFR BLD AUTO: 0 % (ref 0–0.5)
KETONES UR QL STRIP.AUTO: ABNORMAL MG/DL
LEUKOCYTE ESTERASE UR QL STRIP.AUTO: NEGATIVE
LIPASE SERPL-CCNC: 137 U/L (ref 73–393)
LYMPHOCYTES # BLD: 2.4 K/UL (ref 0.8–3.5)
LYMPHOCYTES NFR BLD: 29 % (ref 12–49)
MCH RBC QN AUTO: 30 PG (ref 26–34)
MCHC RBC AUTO-ENTMCNC: 33.6 G/DL (ref 30–36.5)
MCV RBC AUTO: 89.2 FL (ref 80–99)
MONOCYTES # BLD: 0.5 K/UL (ref 0–1)
MONOCYTES NFR BLD: 6 % (ref 5–13)
NEUTS SEG # BLD: 5.4 K/UL (ref 1.8–8)
NEUTS SEG NFR BLD: 65 % (ref 32–75)
NITRITE UR QL STRIP.AUTO: NEGATIVE
NRBC # BLD: 0 K/UL (ref 0–0.01)
NRBC BLD-RTO: 0 PER 100 WBC
PH UR STRIP: 7.5 [PH] (ref 5–8)
PLATELET # BLD AUTO: 270 K/UL (ref 150–400)
PMV BLD AUTO: 8.6 FL (ref 8.9–12.9)
POTASSIUM SERPL-SCNC: 3.9 MMOL/L (ref 3.5–5.1)
PROT SERPL-MCNC: 7.9 G/DL (ref 6.4–8.2)
PROT UR STRIP-MCNC: NEGATIVE MG/DL
RBC # BLD AUTO: 5 M/UL (ref 3.8–5.2)
RBC #/AREA URNS HPF: ABNORMAL /HPF (ref 0–5)
SAMPLES BEING HELD,HOLD: NORMAL
SODIUM SERPL-SCNC: 139 MMOL/L (ref 136–145)
SP GR UR REFRACTOMETRY: 1.02 (ref 1–1.03)
UR CULT HOLD, URHOLD: NORMAL
UROBILINOGEN UR QL STRIP.AUTO: 0.2 EU/DL (ref 0.2–1)
WBC # BLD AUTO: 8.3 K/UL (ref 3.6–11)
WBC URNS QL MICRO: ABNORMAL /HPF (ref 0–4)

## 2018-12-17 PROCEDURE — 74011250636 HC RX REV CODE- 250/636: Performed by: INTERNAL MEDICINE

## 2018-12-17 PROCEDURE — 74011636320 HC RX REV CODE- 636/320: Performed by: RADIOLOGY

## 2018-12-17 PROCEDURE — 80053 COMPREHEN METABOLIC PANEL: CPT

## 2018-12-17 PROCEDURE — 36415 COLL VENOUS BLD VENIPUNCTURE: CPT

## 2018-12-17 PROCEDURE — 74177 CT ABD & PELVIS W/CONTRAST: CPT

## 2018-12-17 PROCEDURE — 96376 TX/PRO/DX INJ SAME DRUG ADON: CPT

## 2018-12-17 PROCEDURE — 99284 EMERGENCY DEPT VISIT MOD MDM: CPT

## 2018-12-17 PROCEDURE — 96374 THER/PROPH/DIAG INJ IV PUSH: CPT

## 2018-12-17 PROCEDURE — 99218 HC RM OBSERVATION: CPT

## 2018-12-17 PROCEDURE — 81001 URINALYSIS AUTO W/SCOPE: CPT

## 2018-12-17 PROCEDURE — 96361 HYDRATE IV INFUSION ADD-ON: CPT

## 2018-12-17 PROCEDURE — 74011250636 HC RX REV CODE- 250/636: Performed by: EMERGENCY MEDICINE

## 2018-12-17 PROCEDURE — 85025 COMPLETE CBC W/AUTO DIFF WBC: CPT

## 2018-12-17 PROCEDURE — 83690 ASSAY OF LIPASE: CPT

## 2018-12-17 PROCEDURE — 96375 TX/PRO/DX INJ NEW DRUG ADDON: CPT

## 2018-12-17 RX ORDER — ONDANSETRON 2 MG/ML
4 INJECTION INTRAMUSCULAR; INTRAVENOUS
Status: DISCONTINUED | OUTPATIENT
Start: 2018-12-17 | End: 2018-12-18 | Stop reason: HOSPADM

## 2018-12-17 RX ORDER — MORPHINE SULFATE 4 MG/ML
2 INJECTION INTRAVENOUS
Status: DISCONTINUED | OUTPATIENT
Start: 2018-12-17 | End: 2018-12-18 | Stop reason: HOSPADM

## 2018-12-17 RX ORDER — SODIUM CHLORIDE 0.9 % (FLUSH) 0.9 %
5-10 SYRINGE (ML) INJECTION EVERY 8 HOURS
Status: DISCONTINUED | OUTPATIENT
Start: 2018-12-17 | End: 2018-12-18 | Stop reason: HOSPADM

## 2018-12-17 RX ORDER — ONDANSETRON 2 MG/ML
4 INJECTION INTRAMUSCULAR; INTRAVENOUS
Status: COMPLETED | OUTPATIENT
Start: 2018-12-17 | End: 2018-12-17

## 2018-12-17 RX ORDER — SODIUM CHLORIDE 0.9 % (FLUSH) 0.9 %
5-10 SYRINGE (ML) INJECTION AS NEEDED
Status: DISCONTINUED | OUTPATIENT
Start: 2018-12-17 | End: 2018-12-18 | Stop reason: HOSPADM

## 2018-12-17 RX ORDER — NALOXONE HYDROCHLORIDE 0.4 MG/ML
0.4 INJECTION, SOLUTION INTRAMUSCULAR; INTRAVENOUS; SUBCUTANEOUS AS NEEDED
Status: DISCONTINUED | OUTPATIENT
Start: 2018-12-17 | End: 2018-12-18 | Stop reason: HOSPADM

## 2018-12-17 RX ORDER — SODIUM CHLORIDE 9 MG/ML
125 INJECTION, SOLUTION INTRAVENOUS CONTINUOUS
Status: DISCONTINUED | OUTPATIENT
Start: 2018-12-17 | End: 2018-12-18 | Stop reason: HOSPADM

## 2018-12-17 RX ORDER — ENOXAPARIN SODIUM 100 MG/ML
40 INJECTION SUBCUTANEOUS EVERY 24 HOURS
Status: DISCONTINUED | OUTPATIENT
Start: 2018-12-17 | End: 2018-12-18 | Stop reason: HOSPADM

## 2018-12-17 RX ADMIN — MORPHINE SULFATE 2 MG: 4 INJECTION, SOLUTION INTRAMUSCULAR; INTRAVENOUS at 23:20

## 2018-12-17 RX ADMIN — ONDANSETRON 4 MG: 2 INJECTION INTRAMUSCULAR; INTRAVENOUS at 23:20

## 2018-12-17 RX ADMIN — ONDANSETRON 4 MG: 2 INJECTION INTRAMUSCULAR; INTRAVENOUS at 20:28

## 2018-12-17 RX ADMIN — IOPAMIDOL 100 ML: 755 INJECTION, SOLUTION INTRAVENOUS at 21:30

## 2018-12-17 RX ADMIN — SODIUM CHLORIDE 125 ML/HR: 900 INJECTION, SOLUTION INTRAVENOUS at 23:24

## 2018-12-17 RX ADMIN — Medication 10 ML: at 23:21

## 2018-12-17 RX ADMIN — SODIUM CHLORIDE 1000 ML: 900 INJECTION, SOLUTION INTRAVENOUS at 21:05

## 2018-12-18 VITALS
HEIGHT: 61 IN | BODY MASS INDEX: 44.99 KG/M2 | RESPIRATION RATE: 16 BRPM | HEART RATE: 74 BPM | TEMPERATURE: 98.2 F | OXYGEN SATURATION: 91 % | WEIGHT: 238.3 LBS | DIASTOLIC BLOOD PRESSURE: 63 MMHG | SYSTOLIC BLOOD PRESSURE: 137 MMHG

## 2018-12-18 PROCEDURE — 74011250637 HC RX REV CODE- 250/637: Performed by: FAMILY MEDICINE

## 2018-12-18 PROCEDURE — 99218 HC RM OBSERVATION: CPT

## 2018-12-18 PROCEDURE — 74011250636 HC RX REV CODE- 250/636: Performed by: INTERNAL MEDICINE

## 2018-12-18 PROCEDURE — 74011250637 HC RX REV CODE- 250/637: Performed by: INTERNAL MEDICINE

## 2018-12-18 PROCEDURE — 96376 TX/PRO/DX INJ SAME DRUG ADON: CPT

## 2018-12-18 PROCEDURE — 96361 HYDRATE IV INFUSION ADD-ON: CPT

## 2018-12-18 PROCEDURE — 96372 THER/PROPH/DIAG INJ SC/IM: CPT

## 2018-12-18 PROCEDURE — 96366 THER/PROPH/DIAG IV INF ADDON: CPT

## 2018-12-18 RX ORDER — DEXTROMETHORPHAN/PSEUDOEPHED 2.5-7.5/.8
40 DROPS ORAL 3 TIMES DAILY
Status: DISCONTINUED | OUTPATIENT
Start: 2018-12-18 | End: 2018-12-18 | Stop reason: HOSPADM

## 2018-12-18 RX ORDER — TRIAMTERENE AND HYDROCHLOROTHIAZIDE 37.5; 25 MG/1; MG/1
1 CAPSULE ORAL EVERY EVENING
COMMUNITY

## 2018-12-18 RX ORDER — FUROSEMIDE 40 MG/1
40 TABLET ORAL
COMMUNITY

## 2018-12-18 RX ORDER — MULTIVITAMIN
1 TABLET ORAL DAILY
COMMUNITY

## 2018-12-18 RX ORDER — HYDRALAZINE HYDROCHLORIDE 20 MG/ML
10 INJECTION INTRAMUSCULAR; INTRAVENOUS
Status: DISCONTINUED | OUTPATIENT
Start: 2018-12-18 | End: 2018-12-18 | Stop reason: HOSPADM

## 2018-12-18 RX ORDER — BUTALBITAL, ACETAMINOPHEN AND CAFFEINE 50; 325; 40 MG/1; MG/1; MG/1
1 TABLET ORAL
Status: DISCONTINUED | OUTPATIENT
Start: 2018-12-18 | End: 2018-12-18 | Stop reason: HOSPADM

## 2018-12-18 RX ORDER — LEVOTHYROXINE SODIUM 100 UG/1
100 TABLET ORAL
COMMUNITY

## 2018-12-18 RX ORDER — PANTOPRAZOLE SODIUM 40 MG/1
40 TABLET, DELAYED RELEASE ORAL
COMMUNITY

## 2018-12-18 RX ORDER — LEVOTHYROXINE SODIUM 88 UG/1
88 TABLET ORAL
COMMUNITY
End: 2018-12-18

## 2018-12-18 RX ORDER — MELATONIN
5000 DAILY
COMMUNITY

## 2018-12-18 RX ORDER — ACETAMINOPHEN 325 MG/1
650 TABLET ORAL
Status: DISCONTINUED | OUTPATIENT
Start: 2018-12-18 | End: 2018-12-18 | Stop reason: HOSPADM

## 2018-12-18 RX ADMIN — SIMETHICONE 40 MG: 20 SUSPENSION/ DROPS ORAL at 01:16

## 2018-12-18 RX ADMIN — SODIUM CHLORIDE 125 ML/HR: 900 INJECTION, SOLUTION INTRAVENOUS at 01:56

## 2018-12-18 RX ADMIN — SIMETHICONE 40 MG: 20 SUSPENSION/ DROPS ORAL at 08:03

## 2018-12-18 RX ADMIN — ENOXAPARIN SODIUM 40 MG: 40 INJECTION SUBCUTANEOUS at 01:21

## 2018-12-18 RX ADMIN — MORPHINE SULFATE 2 MG: 4 INJECTION, SOLUTION INTRAMUSCULAR; INTRAVENOUS at 04:35

## 2018-12-18 RX ADMIN — ONDANSETRON 4 MG: 2 INJECTION INTRAMUSCULAR; INTRAVENOUS at 13:01

## 2018-12-18 RX ADMIN — ACETAMINOPHEN 650 MG: 325 TABLET, FILM COATED ORAL at 05:19

## 2018-12-18 RX ADMIN — BUTALBITAL, ACETAMINOPHEN AND CAFFEINE 1 TABLET: 50; 325; 40 TABLET ORAL at 10:12

## 2018-12-18 RX ADMIN — Medication 10 ML: at 05:20

## 2018-12-18 RX ADMIN — SODIUM CHLORIDE 125 ML/HR: 900 INJECTION, SOLUTION INTRAVENOUS at 10:11

## 2018-12-18 NOTE — H&P
[facility]  [address] [facility], [place] [address]  [phone]    Admission History and Physical      NAME:  Daphne Ramsay   :   1955   MRN:  [id number]     PCP:  Mimi Lam MD     Date/Time:  2018         Subjective:     CHIEF COMPLAINT: \"I can't swallow\"     HISTORY OF PRESENT ILLNESS:     Ms. Shima Novoa is a 66 y.o.  female with PMH of HTN, XOL, MS, DM and prior esophageal strictures requiring dilation x 2 (3 years apart) admitted for epigastric ab pain. Per pt, noted that yesterday after eating a plantain felt like something got stuck in her esophagus. Since then has not been able to eat or drink anything due to epigastric discomfort. Past Medical History:   Diagnosis Date    Diabetes ([address]     Hypercholesterolemia     Hypertension     Multiple sclerosis ([facility]     Thyroid disease         Past Surgical History:   Procedure Laterality Date    HX ADENOIDECTOMY      HX HERNIA REPAIR  2007    hiatal hernia    HX TONSILLECTOMY      SHOULDER SURG PROC UNLISTED  2008    massive shoulder repair; has titanium screws       Social History     Tobacco Use    Smoking status: Never Smoker    Smokeless tobacco: Never Used   Substance Use Topics    Alcohol use: No        Family History   Problem Relation Age of Onset    Heart Surgery Father     Heart Attack Father     Heart Disease Father     Cancer Paternal Grandfather         \"all over the body\"        Allergies   Allergen Reactions    Pcn [Penicillins] Anaphylaxis    Azithromycin Other (comments)     Tongue gotten black nad had bumps on the back of the tongue. She tolerates Biaxin per patient        Prior to Admission medications    Medication Sig Start Date End Date Taking? Authorizing Provider   aspirin 81 mg chewable tablet Take 81 mg by mouth daily. Provider, Historical   triamterene-hydroCHLOROthiazide (DYAZIDE) 37.5-25 mg per capsule Take 1 Cap by mouth daily.  5/15/17 Leslie Ny MD   lovastatin (MEVACOR) 40 mg tablet Take 1 Tab by mouth nightly. 4/18/17   Breznau, Calleen Hammans, MD   pantoprazole (PROTONIX) 20 mg tablet Take 1 Tab by mouth daily. 4/18/17   Breznau, Calleen Hammans, MD   ascorbic acid (VITAMIN C) 500 mg tablet Take 1,000 mg by mouth. Provider, Historical   meloxicam (MOBIC) 7.5 mg tablet Take 1 Tab by mouth daily. 7/16/15   Koby Rivera MD   levothyroxine (SYNTHROID) 75 mcg tablet Take 1 Tab by mouth Daily (before breakfast). Patient taking differently: Take 88 mcg by mouth Daily (before breakfast). 7/13/15   Koby Rivera MD   omega-3 fatty acids-vitamin e (FISH OIL) 1,000 mg cap Take 1 Cap by mouth. Provider, Historical   Cholecalciferol, Vitamin D3, 50,000 unit cap Take  by mouth. Over the counter, dose unknown    Provider, Historical   cyanocobalamin (VITAMIN B-12) 1,000 mcg tablet Take 1,000 mcg by mouth daily. Provider, Historical   multivitamin (ONE A DAY) tablet Take 1 Tab by mouth daily.     Provider, Historical         Review of Systems:  (bold if positive, if negative)    Gen:  Eyes:  ENT:  CVS:  Pulm:  GI:    :    MS:  Skin:  Psych:  Endo:    Hem:  Renal:    Neuro:     Epigastric ab pain        Objective:      VITALS:    Vital signs reviewed; most recent are:    Visit Vitals  /68   Pulse (!) 56   Temp 98.5 °F (36.9 °C)   Resp 18   Ht 5' 1\" (1.549 m)   Wt 108.1 kg (238 lb 4.8 oz)   SpO2 93%   BMI 45.03 kg/m²     SpO2 Readings from Last 6 Encounters:   12/17/18 93%   05/16/17 92%   04/18/17 95%   04/14/16 96%   01/04/16 96%   07/10/15 93%        No intake or output data in the 24 hours ending 12/18/18 0032         Exam:     Physical Exam:    Gen:  Well-developed, well-nourished, in no acute distress  HEENT:  Pink conjunctivae, PERRL, hearing intact to voice, moist mucous membranes  Neck:  Supple, without masses, thyroid non-tender  Resp:  No accessory muscle use, clear breath sounds without wheezes rales or rhonchi  Card:  No murmurs, normal S1, S2 without thrills, bruits or peripheral edema  Abd: Distended. Hypoactive. Epigastric tenderness   Lymph:  No cervical adenopathy  Musc:  No cyanosis or clubbing  Skin:  No rashes or ulcers, skin turgor is good  Neuro:  Cranial nerves 3-12 are grossly intact,  strength is 5/5 bilaterally, dorsi / plantarflexion strength is 5/5 bilaterally, follows commands appropriately  Psych:  Alert with good insight. Oriented to person, place, and time       Labs:    Recent Labs     12/17/18 2029   WBC 8.3   HGB 15.0   HCT 44.6        Recent Labs     12/17/18 2029      K 3.9      CO2 31   *   BUN 12   CREA 0.73   CA 9.5   ALB 3.6   SGOT 17   ALT 26     No components found for: GLPOC  No results for input(s): PH, PCO2, PO2, HCO3, FIO2 in the last 72 hours. No results for input(s): INR in the last 72 hours. No lab exists for component: INREXT    Ab CT =>   1. Large hiatal hernia versus distended distal esophagus containing enteric  content, incompletely imaged.   2. Incidental findings as above     Assessment/Plan:    Dilation of esophagus (12/17/2018) - suspect 2/2 food bolus and stricture   -admit   -IVF's   -NPO for endoscopy in the AM        Acquired hypothyroidism (4/20/2016)   -continue levothyroxine; change to IV       Essential hypertension (4/20/2016)  -unable to tolerate PO  -PRN hydralazine       Pure hypercholesterolemia (4/20/2016)  -unable to tolerate PO; hold statin for now       Diabetes mellitus type 2, diet-controlled ([facility] [address] (4/20/2016) - unable to tolerate PO   -not on meds; monitor     Surrogate decision maker:      Total time spent with patient: 48 [address] discussed with: Patient and Family    Discussed:  Code Status, Care Plan and D/C Planning    Prophylaxis:  Lovenox    Probable Disposition:  Home w/Family           ___________________________________________________    Attending Physician: Gina Sullivan MD

## 2018-12-18 NOTE — PROGRESS NOTES
12/18/2018  10:26 AM  Case management note    Met with patient /  who are not happy with stay. They were expecting patient to get treatment for her esophagus. At this time I have asked them if we can try and get a plan together, I have reached out to NP for gastro. Nurse management and patient advocate have been notified that they are unhappy with care    Patient lives with in one story home with 5 steps to enter. Patient drives and performs ADL's independently. [facility] @ [facility] for [employer] needs  No advance directive  John Holliday MD for medical management.  No NN    Care Management Interventions  PCP Verified by CM: Yes(mary beltran no nn)  Mode of Transport at Discharge: Self  Transition of Care Consult (CM Consult): Discharge Planning  Current Support Network: Lives with Spouse  Confirm Follow Up Transport: Family  Discharge Location  Discharge Placement: Home with family assistance  Adal Jimenez

## 2018-12-18 NOTE — PROGRESS NOTES
Patient called nurse into room to inform that she would like to leave AMA. Form signed in the presence of Charge RN and patient advocate. PIV removed and patient left with spouse.

## 2018-12-18 NOTE — PROGRESS NOTES
Pharmacy Dosing Note: Synthroid IV  Will hold per protocol x 72 hours, if still NPO will start Levothyroxine at ~ half the PO dose and continue to monitor.   Thank you  Sachin Argueta, PharmD BCPS

## 2018-12-18 NOTE — PROGRESS NOTES
0830: Patient with concerns about time of the endoscopy. Spoke to Vanita Chowdary in endo. Patient not on schedule. Kimber to reach out to LUXA NP to clarify. Family updated. 0900: Dr. Darin Layne rounded on patient. Patient not a part of his group. Orders to notify hospitalist. Notified Dr. Herlene Gottron. Hospitalist will be attending group    0930: Casey Golden in to see patient. Patient and  upset about endoscopy. Jenniffer Smith to notify patient advocate. 1000: Patient with complaint of dizziness and headache of 9/10. Call placed to Dr. Herlene Gottron. Spoke to Dr. Herlene Gottron. Orders for fiorcet    1045: Rounded on patient. Patient sleeping with  at the bedside. Spoke to Vanita Chowdary in endo. No word from LUXA NP. Tiger texted Olivia Washington NP . Will be in to see patient after lunch. Family update. 1135: Patient advocate at bedside. 1200: Patient ambulated to bathroom. Returned to bed. Dr. Herlene Gottron in to see patient. Patient still with headache and complaint of dizziness. TRANSFER - OUT REPORT:    Verbal report given to Vane RN(name) on [address]  being transferred to 5th floor(unit) for routine progression of care       Report consisted of patients Situation, Background, Assessment and   Recommendations(SBAR). Information from the following report(s) SBAR, Kardex, Intake/Output, MAR and Recent Results was reviewed with the receiving nurse. Lines:   Peripheral IV 12/17/18 Right Antecubital (Active)   Site Assessment Clean, dry, & intact 12/18/2018  7:34 AM   Phlebitis Assessment 0 12/18/2018  7:34 AM   Infiltration Assessment 0 12/18/2018  7:34 AM   Dressing Status Clean, dry, & intact 12/18/2018  7:34 AM   Dressing Type Transparent 12/18/2018  7:34 AM   Hub Color/Line Status Pink; Infusing 12/18/2018  7:34 AM   Alcohol Cap Used Yes 12/18/2018  7:34 AM        Opportunity for questions and clarification was provided.       Patient transported with:   [employer]

## 2018-12-18 NOTE — ED TRIAGE NOTES
Patient arrives for complaints of abdominal pain and feeling like something is stuck when she tries to swallow since yesterday. She has history of esophageal dilatation.

## 2018-12-18 NOTE — PROGRESS NOTES
12/17/2018  10:55 PM    CM educated patient about her admission under OBS status. Patient signed form, received a copy for her records, and CM placed one to be scanned into her chart.

## 2018-12-18 NOTE — ED NOTES
Bedside and Verbal shift change report given to Loni Vargas (oncoming nurse) by Pete Gallagher (offgoing nurse). Report included the following information SBAR, ED Summary, MAR and Recent Results.

## 2018-12-18 NOTE — PROGRESS NOTES
[facility]: MED RECONCILIATION      Medications added:   · Calcium + Vit D3  · Magnesium  · Furosemide    Medications removed:  · Meloxicam 7.5mg daily    Medications adjusted:  · Vit D3- previously 50,000 units w/o frequency  · Protonix- previously 40mg daily  · Fish Oil- previously 1 cap w/o frequency    Information obtained from: Patient (alert, oriented, good historian), spouse, RxQuery      Allergies: Pcn [penicillins] and Azithromycin    Prior to Admission Medications:     Prior to Admission Medications   Prescriptions Last Dose Informant Patient Reported? Taking? MAGNESIUM PO 12/17/2018 at AM  Yes Yes   Sig: Take 1 Tab by mouth daily. ascorbic acid (VITAMIN C) 500 mg tablet 12/17/2018 at AM  Yes Yes   Sig: Take 1,000 mg by mouth. aspirin 81 mg chewable tablet 12/17/2018 at AM  Yes Yes   Sig: Take 81 mg by mouth daily. calcium-cholecalciferol, D3, (CALTRATE 600+D) tablet 12/17/2018 at AM  Yes Yes   Sig: Take 1 Tab by mouth daily. cholecalciferol (VITAMIN D3) 1,000 unit tablet 12/17/2018 at AM  Yes Yes   Sig: Take 5,000 Units by mouth daily. cyanocobalamin (VITAMIN B-12) 1,000 mcg tablet 12/17/2018 at AM  Yes Yes   Sig: Take 1,000 mcg by mouth daily. furosemide (LASIX) 40 mg tablet   Yes Yes   Sig: Take 40 mg by mouth daily as needed. levothyroxine (SYNTHROID) 100 mcg tablet 12/17/2018 at AM  Yes Yes   Sig: Take 100 mcg by mouth Daily (before breakfast). lovastatin (MEVACOR) 40 mg tablet 12/17/2018 at PM  No Yes   Sig: Take 1 Tab by mouth nightly. multivitamin (ONE A DAY) tablet 12/17/2018 at AM  Yes Yes   Sig: Take 1 Tab by mouth daily. omega-3 fatty acids-vitamin e (FISH OIL) 1,000 mg cap 12/17/2018 at AM  Yes Yes   Sig: Take 4 Caps by mouth daily. pantoprazole (PROTONIX) 40 mg tablet   Yes Yes   Sig: Take 40 mg by mouth daily as needed. triamterene-hydroCHLOROthiazide (DYAZIDE) 37.5-25 mg per capsule 12/17/2018 at PM  Yes Yes   Sig: Take 1 Cap by mouth every evening. Facility-Administered Medications: None          Guille Wick Vinny

## 2018-12-18 NOTE — ED PROVIDER NOTES
I have evaluated the patient as the Provider in Triage. I have reviewed Her vital signs and the triage nurse assessment. I have talked with the patient and any available family and advised that I am the provider in triage and have ordered the appropriate study to initiate their work up based on the clinical presentation during my assessment. I have advised that the patient will be accommodated in the Main ED as soon as possible. I have also requested to contact the triage nurse or myself immediately if the patient experiences any changes in their condition during this brief waiting period. Unique Narvaez MD     66 y.o. Female who presents from home via a private vehicle with chief complaint of generalized abdominal pain and distention. Pt reports that she has a history of esophageal dilation and that her symptoms today feel similar. Pt reports feeling that something is stuck in her chest. Pt states this has contributed to her nausea and vomiting since yesterday. As result, pt states she has been unable to eat or drink much over the last 48 hours. Pt reports she is also feeling dizzy. Of note, pt reports she had a hiatal hernia repair and Dr. Keaton Christina, GI, has been managing her post-op. Pt denies any fever or diarrhea. There are no other acute medical concerns at this time. PCP: Polo Smith MD    Note written by Briscoe Lennox, Scribe, as dictated by Pete Sousa MD 7:40 PM.    66 y.o. female with past medical history significant for HTN, DM, MS, thyroid disease, and hypercholesterolemia who presents via private vehicle from home accompanied by her  with chief complaint of dysphagia. Patient arrives c/o \"feeling like something is stuck in (her) throat\" causing her to vomit, onset yesterday with associated SOB. Patient endorses Hx of esophageal dilation for similar symptoms.  Patient notes she is able to swallow her own saliva and can tolerate \"small sips\" of liquid, but reports little PO intake x2 days. Patient also reports a \"gurgling\" noise from her stomach that she \"does not believe\" is d/t appetite. Patient has not informed her PCP of present symptoms. She specifically denies any fevers, chills, chest pain, urinary sx, headache, rash, diarrhea, sweating or weight loss. There are no other acute medical concerns at this time. Social hx: Never tobacco smoker; Denies EtOH use; Denies illicit drug use  PCP: Christian Stephens MD    Note written by Lori Contreras, as dictated by Tre Davis PA-C 9:06 PM      The history is provided by the patient. No  was used.         Past Medical History:   Diagnosis Date    Diabetes ([facility] [address]     Hypercholesterolemia     Hypertension     Multiple sclerosis ([facility] [address]     Thyroid disease        Past Surgical History:   Procedure Laterality Date    HX ADENOIDECTOMY      HX HERNIA REPAIR  12/17/2007    hiatal hernia    HX TONSILLECTOMY      SHOULDER SURG PROC UNLISTED  7/18/2008    massive shoulder repair; has titanium screws         Family History:   Problem Relation Age of Onset    Heart Surgery Father     Heart Attack Father     Heart Disease Father     Cancer Paternal Grandfather         \"all over the body\"       Social History     Socioeconomic History    Marital status:      Spouse name: Not on file    Number of children: Not on file    Years of education: Not on file    Highest education level: Not on file   Social Needs    Financial resource strain: Not on file    Food insecurity - worry: Not on file    Food insecurity - inability: Not on file   [facility] needs - medical: Not on file   [facility] needs - non-medical: Not on file   Occupational History    Not on file   Tobacco Use    Smoking status: Never Smoker    Smokeless tobacco: Never Used   Substance and Sexual Activity    Alcohol use: No    Drug use: No    Sexual activity: Not on file   Other Topics Concern    Not on file   Social History Narrative    Not on file         ALLERGIES: Pcn [penicillins] and Azithromycin    Review of Systems   Constitutional: Negative for chills and fever. HENT: Positive for trouble swallowing. Negative for congestion, rhinorrhea, sneezing and sore throat. Eyes: Negative for redness and visual disturbance. Respiratory: Positive for shortness of breath. Cardiovascular: Negative for chest pain and leg swelling. Gastrointestinal: Positive for abdominal pain (diffuse) and vomiting. Negative for diarrhea and nausea. Genitourinary: Negative for difficulty urinating and frequency. Musculoskeletal: Negative for back pain, myalgias and neck stiffness. Skin: Negative for rash. Neurological: Negative for dizziness, syncope, weakness and headaches. Hematological: Negative for adenopathy. Patient Vitals for the past 12 hrs:   Temp Pulse Resp BP SpO2   12/17/18 2311 98.5 °F (36.9 °C) (!) 56 18 148/68 93 %   12/17/18 2200    131/79    12/17/18 2159     97 %   12/17/18 1944 98.5 °F (36.9 °C) 76 18 142/80 95 %              Physical Exam   Constitutional: She is oriented to person, place, and time. She appears well-developed and well-nourished. No distress. Markedly above average bmi female   HENT:   Head: Normocephalic and atraumatic. Right Ear: External ear normal.   Left Ear: External ear normal.   Nose: Nose normal.   Mouth/Throat: Oropharynx is clear and moist. No oropharyngeal exudate. Eyes: EOM are normal. Pupils are equal, round, and reactive to light. Neck: Neck supple. No JVD present. No tracheal deviation present. Cardiovascular: Normal rate, regular rhythm, normal heart sounds and intact distal pulses. Exam reveals no gallop and no friction rub. No murmur heard. Pulmonary/Chest: Effort normal and breath sounds normal. No stridor. No respiratory distress. She has no wheezes. She has no rales. She exhibits no tenderness. Abdominal: Soft.  Bowel sounds are normal. She exhibits no distension and no mass. There is tenderness. There is no rebound and no guarding. No hernia. Diffuse upper abd ttp, more focal to epigastric areas without rebounding or guarding. No cvat   Musculoskeletal: Normal range of motion. She exhibits no edema, tenderness or deformity. Lymphadenopathy:     She has no cervical adenopathy. Neurological: She is alert and oriented to person, place, and time. No cranial nerve deficit. Coordination normal.   Skin: Skin is warm and dry. Capillary refill takes less than 2 seconds. No rash noted. No erythema. No pallor. Psychiatric: She has a normal mood and affect. Her behavior is normal.   Nursing note and vitals reviewed. MDM  Number of Diagnoses or Management Options     Amount and/or Complexity of Data Reviewed  Clinical lab tests: ordered and reviewed  Tests in the radiology section of CPT®: ordered and reviewed  Tests in the medicine section of CPT®: reviewed and ordered  Obtain history from someone other than the patient: yes (family)  Review and summarize past medical records: yes  Independent visualization of images, tracings, or specimens: yes    Patient Progress  Patient progress: stable         Procedures    PROGRESS NOTE:  9:15 PM  Patient took several small sips of water and tolerated secretions. Per , patient has c/o upper abdominal pain for \"a while\", worse 2 days ago    9:48 PM  Discussed pt, sx, hx and current findings with Doreen Turk MD. He is in agreement with plan and will see pt. Will get labs, ct and continue to monitor  Lender Keirsten. TREVIN Donohue    10:16 PM  Spoke with Dr. Levi Sanz - recommends admission and will scope in the morning. Wants patient NPO.    10:20  PM  Jassi Dillon PA-C spoke with Dr. Micheline Hollis, Consult for Medicine. Discussed available diagnostic tests and clinical findings. He is in agreement with care plans as outlined. He will admit  AMANDA Yap     10:36 PM   Pt notes she sees Dr Bianka Chan. Will call hospitalist for admission  Evens oDnohue PA-C    10:37 PM  Evens Donohue PA-C spoke with Dr. Dominga Rueda, Consult for Hospitalist. Discussed available diagnostic tests and clinical findings. She is in agreement with care plans as outlined. She will admit  AMANDA Sam    LABS COMPLETED AND REVIEWED:  Recent Results (from the past 12 hour(s))   CBC WITH AUTOMATED DIFF    Collection Time: 12/17/18  8:29 PM   Result Value Ref Range    WBC 8.3 3.6 - 11.0 K/uL    RBC 5.00 3.80 - 5.20 M/uL    HGB 15.0 11.5 - 16.0 g/dL    HCT 44.6 35.0 - 47.0 %    MCV 89.2 80.0 - 99.0 FL    MCH 30.0 26.0 - 34.0 PG    MCHC 33.6 30.0 - 36.5 g/dL    RDW 12.5 11.5 - 14.5 %    PLATELET 747 594 - 571 K/uL    MPV 8.6 (L) 8.9 - 12.9 FL    NRBC 0.0 0  WBC    ABSOLUTE NRBC 0.00 0.00 - 0.01 K/uL    NEUTROPHILS 65 32 - 75 %    LYMPHOCYTES 29 12 - 49 %    MONOCYTES 6 5 - 13 %    EOSINOPHILS 0 0 - 7 %    BASOPHILS 0 0 - 1 %    IMMATURE GRANULOCYTES 0 0.0 - 0.5 %    ABS. NEUTROPHILS 5.4 1.8 - 8.0 K/UL    ABS. LYMPHOCYTES 2.4 0.8 - 3.5 K/UL    ABS. MONOCYTES 0.5 0.0 - 1.0 K/UL    ABS. EOSINOPHILS 0.0 0.0 - 0.4 K/UL    ABS. BASOPHILS 0.0 0.0 - 0.1 K/UL    ABS. IMM. GRANS. 0.0 0.00 - 0.04 K/UL    DF AUTOMATED     METABOLIC PANEL, COMPREHENSIVE    Collection Time: 12/17/18  8:29 PM   Result Value Ref Range    Sodium 139 136 - 145 mmol/L    Potassium 3.9 3.5 - 5.1 mmol/L    Chloride 101 97 - 108 mmol/L    CO2 31 21 - 32 mmol/L    Anion gap 7 5 - 15 mmol/L    Glucose 124 (H) 65 - 100 mg/dL    BUN 12 6 - 20 MG/DL    Creatinine 0.73 0.55 - 1.02 MG/DL    BUN/Creatinine ratio 16 12 - 20      GFR est AA >60 >60 ml/min/1.73m2    GFR est non-AA >60 >60 ml/min/1.73m2    Calcium 9.5 8.5 - 10.1 MG/DL    Bilirubin, total 0.3 0.2 - 1.0 MG/DL    ALT (SGPT) 26 12 - 78 U/L    AST (SGOT) 17 15 - 37 U/L    Alk.  phosphatase 112 45 - 117 U/L    Protein, total 7.9 6.4 - 8.2 g/dL    Albumin 3.6 3.5 - 5.0 g/dL    Globulin 4.3 (H) 2.0 - 4.0 g/dL    A-G Ratio 0.8 (L) 1.1 - 2.2     LIPASE    Collection Time: 12/17/18  8:29 PM   Result Value Ref Range    Lipase 137 73 - 393 U/L   SAMPLES BEING HELD    Collection Time: 12/17/18  8:29 PM   Result Value Ref Range    SAMPLES BEING HELD SST. MICKEY     COMMENT        Add-on orders for these samples will be processed based on acceptable specimen integrity and analyte stability, which may vary by analyte. URINALYSIS W/MICROSCOPIC    Collection Time: 12/17/18  8:29 PM   Result Value Ref Range    Color YELLOW/STRAW      Appearance CLOUDY (A) CLEAR      Specific gravity 1.020 1.003 - 1.030      pH (UA) 7.5 5.0 - 8.0      Protein NEGATIVE  NEG mg/dL    Glucose NEGATIVE  NEG mg/dL    Ketone TRACE (A) NEG mg/dL    Bilirubin NEGATIVE  NEG      Blood NEGATIVE  NEG      Urobilinogen 0.2 0.2 - 1.0 EU/dL    Nitrites NEGATIVE  NEG      Leukocyte Esterase NEGATIVE  NEG      WBC 0-4 0 - 4 /hpf    RBC 0-5 0 - 5 /hpf    Epithelial cells MODERATE (A) FEW /lpf    Bacteria NEGATIVE  NEG /hpf   URINE CULTURE HOLD SAMPLE    Collection Time: 12/17/18  8:29 PM   Result Value Ref Range    Urine culture hold        URINE ON HOLD IN MICROBIOLOGY DEPT FOR 3 DAYS. IF UNPRESERVED URINE IS SUBMITTED, IT CANNOT BE USED FOR ADDITIONAL TESTING AFTER 24 HRS, RECOLLECTION WILL BE REQUIRED. IMAGING COMPLETED AND REVIEWED:  The following have been ordered and reviewed:    Ct Abd Pelv W Cont    Result Date: 12/17/2018  EXAM:  CT ABDOMEN PELVIS WITH CONTRAST INDICATION:  LUQ pain/V. Additional history: COMPARISON: CT of the abdomen and pelvis, 5/16/2017. Fabiola Ying TECHNIQUE: Multislice helical CT was performed from the diaphragm to the symphysis pubis with oral and intravenous contrast administration. Contiguous 5 mm axial images were reconstructed and lung and soft tissue windows were generated. Coronal and sagittal reformations were generated.  CT dose reduction was achieved through use of a standardized protocol tailored for this examination and automatic exposure control for dose modulation. Rhea Murray FINDINGS: INCIDENTALLY IMAGED CHEST: Heart/vessels: Within normal limits. Lungs/Pleura: Motion limited with linear opacity suggesting atelectasis. . ABDOMEN: Liver: Diffuse, diminished attenuation throughout the liver relative to the spleen. Gallbladder/Biliary: Numerous gas containing gallstones. Spleen: Within normal limits. Pancreas: Atrophy. Adrenals: Within normal limits. Kidneys: Within normal limits. Peritoneum/Mesenteries: Within normal limits. Extraperitoneum: Within normal limits. Gastrointestinal tract: Large hiatal hernia versus markedly distended distal esophagus containing enteric content Diverticulosis in the descending and sigmoid colon. Normal-appearing appendix Vascular: Within normal limits. Rhea Trevor PELVIS: Extraperitoneum: Within normal limits. Ureters: Within normal limits. Bladder: Within normal limits. Reproductive System: Within normal limits. . MSK: Within normal limits. .    IMPRESSION: 1. Large hiatal hernia versus distended distal esophagus containing enteric content, incompletely imaged.  2. Incidental findings as above        MEDICATIONS GIVEN:  Medications   sodium chloride (NS) flush 5-10 mL (10 mL IntraVENous Given 12/17/18 2321)   sodium chloride (NS) flush 5-10 mL (not administered)   naloxone [facility]) injection 0.4 mg (not administered)   morphine injection 2 mg (2 mg IntraVENous Given 12/17/18 2320)   ondansetron (ZOFRAN) injection 4 mg (4 mg IntraVENous Given 12/17/18 2320)   enoxaparin (LOVENOX) injection 40 mg (40 mg SubCUTAneous Given 12/18/18 0121)   0.9% sodium chloride infusion (125 mL/hr IntraVENous New Bag 12/17/18 2324)   simethicone (MYLICON) 01KE/2.0BJ oral drops 40 mg (40 mg Oral Given 12/18/18 0116)   hydrALAZINE (APRESOLINE) 20 mg/mL injection 10 mg (not administered)   sodium chloride 0.9 % bolus infusion 1,000 mL (1,000 mL IntraVENous New Bag 12/17/18 2105)   ondansetron (ZOFRAN) injection 4 mg (4 mg IntraVENous Given 12/17/18 2028) iopamidol (ISOVUE-370) 76 % injection 100 mL (100 mL IntraVENous Given 12/17/18 2130)         CLINICAL IMPRESSION:  1. Abdominal pain, epigastric    2. Hiatal hernia    3. Dilation of esophagus          Plan  1. Admission per Dr. Michelle Malloy      10:37 PM  The patient is being admitted to the hospital.  The results of their tests and reasons for their admission have been discussed with them and/or available family. The patient/family has conveyed agreement and understanding for the need to be admitted and for their admission diagnosis. Consultation has been made with the inpatient physician specialist for hospitalization.

## 2018-12-18 NOTE — PROGRESS NOTES
Justus Chapman [facility] [place] [address]  [address]  [phone]      Medical Progress Note      NAME: Siomara Schmidt   :  1955  MRM:  [id number]    Date/Time: 2018  1:39 PM         Subjective:     Chief Complaint:  F/u esophageal dilation    Pt seen and examined at ~11:30. She is expressing frustration that EGD has not been performed. GI contacted and asked to see pt urgently. Objective:       Vitals:          Last 24hrs VS reviewed since prior progress note.  Most recent are:    Visit Vitals  /63 (BP 1 Location: Left arm, BP Patient Position: At rest)   Pulse 74   Temp 98.2 °F (36.8 °C)   Resp 16   Ht 5' 1\" (1.549 m)   Wt 108.1 kg (238 lb 4.8 oz)   SpO2 91%   BMI 45.03 kg/m²     SpO2 Readings from Last 6 Encounters:   18 91%   17 92%   17 95%   16 96%   16 96%   07/10/15 93%        No intake or output data in the 24 hours ending 18 1339       Exam:     Physical Exam:    Gen:  Well-developed, well-nourished, in no acute distress  HEENT:  Pink conjunctivae, PERRL, hearing intact to voice, moist mucous membranes  Neck:  Supple, without masses, thyroid non-tender  Resp:  No accessory muscle use, clear breath sounds without wheezes rales or rhonchi  Card:  No murmurs, normal S1, S2 without thrills, bruits or peripheral edema  Abd:  Soft, non-tender, non-distended, normoactive bowel sounds are present  Musc:  No cyanosis or clubbing  Skin:  No rashes or ulcers, skin turgor is good  Neuro:  Cranial nerves 3-12 are grossly intact,  strength is 5/5 bilaterally and dorsi / plantarflexion is 5/5 bilaterally, follows commands appropriately  Psych:  Good insight, oriented to person, place and time, alert          Medications Reviewed: (see below)    Lab Data Reviewed: (see below)    ______________________________________________________________________    Medications:     Current Facility-Administered Medications Medication Dose Route Frequency    simethicone (MYLICON) 05SU/7.9NG oral drops 40 mg  40 mg Oral TID    hydrALAZINE (APRESOLINE) 20 mg/mL injection 10 mg  10 mg IntraVENous Q6H PRN    acetaminophen (TYLENOL) tablet 650 mg  650 mg Oral Q6H PRN    butalbital-acetaminophen-caffeine (FIORICET, ESGIC) -40 mg per tablet 1 Tab  1 Tab Oral Q6H PRN    sodium chloride (NS) flush 5-10 mL  5-10 mL IntraVENous Q8H    sodium chloride (NS) flush 5-10 mL  5-10 mL IntraVENous PRN    naloxone (NARCAN) injection 0.4 mg  0.4 mg IntraVENous PRN    morphine injection 2 mg  2 mg IntraVENous Q4H PRN    ondansetron (ZOFRAN) injection 4 mg  4 mg IntraVENous Q4H PRN    enoxaparin (LOVENOX) injection 40 mg  40 mg SubCUTAneous Q24H    0.9% sodium chloride infusion  125 mL/hr IntraVENous CONTINUOUS     Current Outpatient Medications   Medication Sig    levothyroxine (SYNTHROID) 100 mcg tablet Take 100 mcg by mouth Daily (before breakfast).  pantoprazole (PROTONIX) 40 mg tablet Take 40 mg by mouth daily as needed.  triamterene-hydroCHLOROthiazide (DYAZIDE) 37.5-25 mg per capsule Take 1 Cap by mouth every evening.  cholecalciferol (VITAMIN D3) 1,000 unit tablet Take 5,000 Units by mouth daily.  calcium-cholecalciferol, D3, (CALTRATE 600+D) tablet Take 1 Tab by mouth daily.  MAGNESIUM PO Take 1 Tab by mouth daily.  furosemide (LASIX) 40 mg tablet Take 40 mg by mouth daily as needed.  aspirin 81 mg chewable tablet Take 81 mg by mouth daily.  lovastatin (MEVACOR) 40 mg tablet Take 1 Tab by mouth nightly.  ascorbic acid (VITAMIN C) 500 mg tablet Take 1,000 mg by mouth.  omega-3 fatty acids-vitamin e (FISH OIL) 1,000 mg cap Take 4 Caps by mouth daily.  cyanocobalamin (VITAMIN B-12) 1,000 mcg tablet Take 1,000 mcg by mouth daily.  multivitamin (ONE A DAY) tablet Take 1 Tab by mouth daily.             Lab Review:     Recent Labs     12/17/18 2029   WBC 8.3   HGB 15.0   HCT 44.6        Recent Labs     12/17/18 2029      K 3.9      CO2 31   *   BUN 12   CREA 0.73   CA 9.5   ALB 3.6   SGOT 17   ALT 26     No components found for: [place]         Assessment / Plan:     Dilation of esophagus: suspect 2/2 food bolus and stricture. Pt seen and examined. She is upset that she has not had her EGD yet. Message sent to GI for urgent evaluation. GI informed pt EGD would be performed tomorrow. She is very upset that this can not be completed today and wishes to leave AMA.  Discussed the possible complications of leaving prior to endoscopic eval including esophageal rupture, worsened stricture, aspiration.        Acquired hypothyroidism: resume synthroid       Essential hypertension (4/20/2016)  -unable to tolerate PO  -PRN hydralazine        Pure hypercholesterolemia (4/20/2016)  -unable to tolerate PO; hold statin for now        Diabetes mellitus type 2, diet-controlled ([address] (4/20/2016) - unable to tolerate PO   -not on meds; monitor           Total time spent with patient: 39 [address] discussed with: Patient, Family and Consultant/Specialist    Discussed:  Care Plan    Prophylaxis:  Lovenox    Disposition:  Home w/Family           ___________________________________________________    Attending Physician: Maeve Dunn MD

## 2018-12-18 NOTE — CONSULTS
[address] [place] [address]. [address] [place] [address] NP  [phone]                    GASTROENTEROLOGY CONSULTATION NOTE              NAME:  Fozia Nichols   :   1955   MRN:   [id number]       Referring Physician:   Dr. Carley Urena Date:   2018 6:00 PM    Chief Complaint:    Dysphagia     History of Present Illness:    Patient is a 66 y.o. who we were asked to see in consultation for the above complaint. She explains that she feels \"uncomfortable\". She denies heartburn but endorses LUQ pain which is sharp and shoots across her abdomen. She complains of feeling dizzy and weak. She denies bloody or black stools. She uses NSAIDs occasionally. CT on admission shows Large hiatal hernia versus distended distal esophagus containing enteric  content, incompletely imaged. PMH:  Past Medical History:   Diagnosis Date    Diabetes ([facility] [address]     Hypercholesterolemia     Hypertension     Multiple sclerosis ([facility] [address]     Thyroid disease        PSH:  Past Surgical History:   Procedure Laterality Date    HX ADENOIDECTOMY      HX HERNIA REPAIR  2007    hiatal hernia    HX TONSILLECTOMY      SHOULDER SURG PROC UNLISTED  2008    massive shoulder repair; has titanium screws       Allergies: Allergies   Allergen Reactions    Pcn [Penicillins] Anaphylaxis    Azithromycin Other (comments)     Tongue gotten black nad had bumps on the back of the tongue. She tolerates Biaxin per patient       Home Medications:  Prior to Admission Medications   Prescriptions Last Dose Informant Patient Reported? Taking? MAGNESIUM PO 2018 at AM  Yes Yes   Sig: Take 1 Tab by mouth daily. ascorbic acid (VITAMIN C) 500 mg tablet 2018 at AM  Yes Yes   Sig: Take 1,000 mg by mouth. aspirin 81 mg chewable tablet 2018 at AM  Yes Yes   Sig: Take 81 mg by mouth daily. calcium-cholecalciferol, D3, (CALTRATE 600+D) tablet 2018 at AM  Yes Yes   Sig: Take 1 Tab by mouth daily. cholecalciferol (VITAMIN D3) 1,000 unit tablet 12/17/2018 at AM  Yes Yes   Sig: Take 5,000 Units by mouth daily. cyanocobalamin (VITAMIN B-12) 1,000 mcg tablet 12/17/2018 at AM  Yes Yes   Sig: Take 1,000 mcg by mouth daily. furosemide (LASIX) 40 mg tablet   Yes Yes   Sig: Take 40 mg by mouth daily as needed. levothyroxine (SYNTHROID) 100 mcg tablet 12/17/2018 at AM  Yes Yes   Sig: Take 100 mcg by mouth Daily (before breakfast). lovastatin (MEVACOR) 40 mg tablet 12/17/2018 at PM  No Yes   Sig: Take 1 Tab by mouth nightly. multivitamin (ONE A DAY) tablet 12/17/2018 at AM  Yes Yes   Sig: Take 1 Tab by mouth daily. omega-3 fatty acids-vitamin e (FISH OIL) 1,000 mg cap 12/17/2018 at AM  Yes Yes   Sig: Take 4 Caps by mouth daily. pantoprazole (PROTONIX) 40 mg tablet   Yes Yes   Sig: Take 40 mg by mouth daily as needed. triamterene-hydroCHLOROthiazide (DYAZIDE) 37.5-25 mg per capsule 12/17/2018 at PM  Yes Yes   Sig: Take 1 Cap by mouth every evening. Facility-Administered Medications: None       Hospital Medications:  No current facility-administered medications for this encounter. Current Outpatient Medications   Medication Sig    levothyroxine (SYNTHROID) 100 mcg tablet Take 100 mcg by mouth Daily (before breakfast).  pantoprazole (PROTONIX) 40 mg tablet Take 40 mg by mouth daily as needed.  triamterene-hydroCHLOROthiazide (DYAZIDE) 37.5-25 mg per capsule Take 1 Cap by mouth every evening.  cholecalciferol (VITAMIN D3) 1,000 unit tablet Take 5,000 Units by mouth daily.  calcium-cholecalciferol, D3, (CALTRATE 600+D) tablet Take 1 Tab by mouth daily.  MAGNESIUM PO Take 1 Tab by mouth daily.  furosemide (LASIX) 40 mg tablet Take 40 mg by mouth daily as needed.  aspirin 81 mg chewable tablet Take 81 mg by mouth daily.  lovastatin (MEVACOR) 40 mg tablet Take 1 Tab by mouth nightly.  ascorbic acid (VITAMIN C) 500 mg tablet Take 1,000 mg by mouth.     omega-3 fatty acids-vitamin e (FISH OIL) 1,000 mg cap Take 4 Caps by mouth daily.  cyanocobalamin (VITAMIN B-12) 1,000 mcg tablet Take 1,000 mcg by mouth daily.  multivitamin (ONE A DAY) tablet Take 1 Tab by mouth daily. Social History:  Social History     Tobacco Use    Smoking status: Never Smoker    Smokeless tobacco: Never Used   Substance Use Topics    Alcohol use: No       Family History:  Family History   Problem Relation Age of Onset    Heart Surgery Father     Heart Attack Father     Heart Disease Father     Cancer Paternal Grandfather         \"all over the body\"       Review of Systems:  Constitutional: negative fever, negative chills, negative weight loss  Eyes:   negative visual changes  ENT:   negative sore throat, tongue or lip swelling  Respiratory:  negative cough, negative dyspnea  Cards:  negative for chest pain, palpitations, lower extremity edema  GI:   See HPI  :  negative for frequency, dysuria  Integument:  negative for rash and pruritus  Heme:  negative for easy bruising and gum/nose bleeding  Musculoskel: negative for myalgias,  back pain and muscle weakness  Neuro: negative for headaches, dizziness, vertigo  Psych:  negative for feelings of anxiety, depression     Objective:     Patient Vitals for the past 8 hrs:   BP Temp Pulse Resp SpO2   12/18/18 1242 137/63 98.2 °F (36.8 °C) 74 16 91 %   12/18/18 1154 139/77 98.4 °F (36.9 °C) 75 16 93 %     No intake/output data recorded. No intake/output data recorded. EXAM:     NEURO-alert, oriented x3, affect appropriate   HEENT-Head: Normocephalic, no lesions, without obvious abnormality. LUNGS-clear to auscultation bilaterally    COR-regular rate and rhythym     ABD- soft, non-tender.  Bowel sounds normal. No masses,  no organomegaly     EXT-no edema    Skin - No rash     Data Review     Recent Labs     12/17/18 2029   WBC 8.3   HGB 15.0   HCT 44.6        Recent Labs     12/17/18 2029      K 3.9      CO2 31   BUN 12 CREA 0.73   *   CA 9.5     Recent Labs     12/17/18 2029   SGOT 17      TP 7.9   ALB 3.6   GLOB 4.3*   LPSE 137     No results for input(s): INR, PTP, APTT in the last 72 hours. No lab exists for component: INREXT    Patient Active Problem List   Diagnosis Code    Acquired hypothyroidism E03.9    Essential hypertension I10    Pure hypercholesterolemia E78.00    Diabetes mellitus type 2, diet-controlled ([facility] [address] E11.9    Dilation of esophagus K22.8       Assessment and Plan;  Dysphagia:  CT scan showing Large hiatal hernia versus distended distal esophagus containing enteric  content, incompletely imaged. Discussed with patient that would not be able to perform EGD today and that we would have to do the procedure tomorrow. She and her  expressed that they were not happy with having to wait and are going to leave the hospital.          Thanks for allowing me to participate in the care of this patient.   Signed By: Yaima Roberts NP     12/18/2018  6:00 PM

## 2022-03-18 PROBLEM — K22.89 DILATION OF ESOPHAGUS: Status: ACTIVE | Noted: 2018-12-17

## 2024-07-30 ENCOUNTER — HOSPITAL ENCOUNTER (EMERGENCY)
Facility: HOSPITAL | Age: 69
Discharge: HOME | End: 2024-07-30
Attending: EMERGENCY MEDICINE
Payer: MEDICARE

## 2024-07-30 ENCOUNTER — APPOINTMENT (OUTPATIENT)
Facility: HOSPITAL | Age: 69
End: 2024-07-30
Payer: MEDICARE

## 2024-07-30 ENCOUNTER — APPOINTMENT (OUTPATIENT)
Dept: VASCULAR SURGERY | Facility: HOSPITAL | Age: 69
End: 2024-07-30
Payer: MEDICARE

## 2024-07-30 VITALS
OXYGEN SATURATION: 97 % | DIASTOLIC BLOOD PRESSURE: 75 MMHG | WEIGHT: 225 LBS | RESPIRATION RATE: 18 BRPM | SYSTOLIC BLOOD PRESSURE: 150 MMHG | TEMPERATURE: 97.5 F | HEIGHT: 61 IN | BODY MASS INDEX: 42.48 KG/M2 | HEART RATE: 96 BPM

## 2024-07-30 DIAGNOSIS — M66.0 BAKER'S CYST, RUPTURED: ICD-10-CM

## 2024-07-30 DIAGNOSIS — M25.561 ACUTE PAIN OF RIGHT KNEE: ICD-10-CM

## 2024-07-30 DIAGNOSIS — M25.571 ACUTE RIGHT ANKLE PAIN: ICD-10-CM

## 2024-07-30 DIAGNOSIS — M79.89 RIGHT LEG SWELLING: Primary | ICD-10-CM

## 2024-07-30 PROCEDURE — 93971 EXTREMITY STUDY: CPT

## 2024-07-30 PROCEDURE — 73610 X-RAY EXAM OF ANKLE: CPT

## 2024-07-30 PROCEDURE — 99283 EMERGENCY DEPT VISIT LOW MDM: CPT

## 2024-07-30 PROCEDURE — 73130 X-RAY EXAM OF HAND: CPT

## 2024-07-30 PROCEDURE — 73562 X-RAY EXAM OF KNEE 3: CPT

## 2024-07-30 ASSESSMENT — ENCOUNTER SYMPTOMS
SHORTNESS OF BREATH: 0
BACK PAIN: 0

## 2024-07-30 ASSESSMENT — PAIN SCALES - GENERAL: PAINLEVEL_OUTOF10: 9

## 2024-07-30 ASSESSMENT — PAIN DESCRIPTION - LOCATION: LOCATION: LEG;KNEE

## 2024-07-30 ASSESSMENT — PAIN DESCRIPTION - DESCRIPTORS: DESCRIPTORS: SORE

## 2024-07-30 ASSESSMENT — PAIN DESCRIPTION - ORIENTATION: ORIENTATION: RIGHT

## 2024-07-30 ASSESSMENT — PAIN - FUNCTIONAL ASSESSMENT: PAIN_FUNCTIONAL_ASSESSMENT: 0-10

## 2024-07-30 NOTE — ED TRIAGE NOTES
Pt reports she has had exhaustion, R sided leg swelling and knee pain, and diarrhea since 7/6/24. Pt reports she has had urinary frequency and incontinence as well. Pt reports she has been taking 2 doses of steroids without improvement in swelling and pain of RLE. Pt reports diarrhea stopped recently. Hx MS, HLD, DM, HTN, hernia repair. Provider in triage.

## 2024-07-30 NOTE — ED PROVIDER NOTES
[facility] EMERGENCY DEPT  EMERGENCY DEPARTMENT ENCOUNTER      Pt Name: Shaylee Angel  MRN: [id number]  Birthdate 1955  Date of evaluation: 7/30/2024  Provider: DUYEN Wise    CHIEF COMPLAINT       Chief Complaint   Patient presents with    Fatigue    Leg Pain         HISTORY OF PRESENT ILLNESS    Patient is a 70 yo female with history of HTN, HLD, DM, MS who presents to ED due to right knee and ankle pain that started a few weeks ago.  Reports right knee and ankle pain x 2 weeks which have been ongoing.  She has had 2 steroid Dosepaks without improvement of symptoms.  Patient reports this is caused by \"shiitake mushrooms.\"  States she also has right fourth finger pain which she attributes to \"shiitake mushrooms\" denies any improvement of symptoms after 2 steroid Dosepaks.  Seen by her PCP earlier and referred to the ED to rule out DVT.  Denies any prior history of DVT, VTE, recent surgery, long travel, prolonged immobilization.  Denies any known injury or trauma.  Denies any fever, chills, numbness, weakness, chest pain, shortness of breath, back pain.            Review of External Medical Records:     Nursing Notes were reviewed.    REVIEW OF SYSTEMS       Review of Systems   Constitutional:  Negative for chills and fever.   Respiratory:  Negative for shortness of breath.    Cardiovascular:  Positive for leg swelling. Negative for chest pain and palpitations.   Musculoskeletal:  Positive for arthralgias and joint swelling. Negative for back pain, gait problem, myalgias, neck pain and neck stiffness.   Skin:  Negative for wound.   Neurological:  Negative for weakness and numbness.   All other systems reviewed and are negative.      Except as noted above the remainder of the review of systems was reviewed and negative.       PAST MEDICAL HISTORY   No past medical history on file.      SURGICAL HISTORY     No past surgical history on file.      CURRENT MEDICATIONS       Discharge Medication List as of

## 2024-07-31 LAB — ECHO BSA: 2.1 M2

## 2024-07-31 NOTE — DISCHARGE INSTRUCTIONS
Recommend elevating your leg.  Apply ice to your knee and ankle for 30 minutes 2-3 times a day.  Take diclofenac as prescribed.  Please follow-up with the orthopedic specialist whose information is provided.  If you develop new or worsening symptoms please return to the ER.

## 2024-07-31 NOTE — ED NOTES
I have reviewed discharge instructions with the patient and spouse.  Opportunity for questions and clarification was provided.  The patient and spouse verbalized understanding.  Patient discharged out of the ED via W/C with no difficulty and in stable condition.

## (undated) DEVICE — NDL PRT INJ NSAF BLNT 18GX1.5 --

## (undated) DEVICE — SYR 5ML 1/5 GRAD LL NSAF LF --

## (undated) DEVICE — KENDALL RADIOLUCENT FOAM MONITORING ELECTRODE -RECTANGULAR SHAPE: Brand: KENDALL

## (undated) DEVICE — SOLIDIFIER MEDC 1200ML -- CONVERT TO 356117

## (undated) DEVICE — 1200 GUARD II KIT W/5MM TUBE W/O VAC TUBE: Brand: GUARDIAN

## (undated) DEVICE — BITEBLOCK ENDOSCP 60FR MAXI WHT POLYETH STURDY W/ VELC WVN

## (undated) DEVICE — NDL FLTR TIP 5 MIC 18GX1.5IN --

## (undated) DEVICE — KIT COLON W/ 1.1OZ LUB AND 2 END

## (undated) DEVICE — CANNULA CUSH AD W/ 14FT TBG

## (undated) DEVICE — BAG BELONG PT PERS CLEAR HANDL

## (undated) DEVICE — SYR 3ML LL TIP 1/10ML GRAD --